# Patient Record
Sex: MALE | Race: BLACK OR AFRICAN AMERICAN | NOT HISPANIC OR LATINO | Employment: FULL TIME | URBAN - METROPOLITAN AREA
[De-identification: names, ages, dates, MRNs, and addresses within clinical notes are randomized per-mention and may not be internally consistent; named-entity substitution may affect disease eponyms.]

---

## 2021-11-17 ENCOUNTER — OFFICE VISIT (OUTPATIENT)
Dept: FAMILY MEDICINE CLINIC | Facility: CLINIC | Age: 51
End: 2021-11-17
Payer: COMMERCIAL

## 2021-11-17 VITALS
BODY MASS INDEX: 27.83 KG/M2 | RESPIRATION RATE: 16 BRPM | TEMPERATURE: 97.4 F | OXYGEN SATURATION: 64 % | HEIGHT: 68 IN | SYSTOLIC BLOOD PRESSURE: 108 MMHG | HEART RATE: 64 BPM | WEIGHT: 183.6 LBS | DIASTOLIC BLOOD PRESSURE: 80 MMHG

## 2021-11-17 DIAGNOSIS — Z13.220 SCREENING, LIPID: ICD-10-CM

## 2021-11-17 DIAGNOSIS — Z00.00 ANNUAL PHYSICAL EXAM: Primary | ICD-10-CM

## 2021-11-17 DIAGNOSIS — Z13.0 SCREENING, ANEMIA, DEFICIENCY, IRON: ICD-10-CM

## 2021-11-17 DIAGNOSIS — G89.29 CHRONIC RIGHT-SIDED LOW BACK PAIN WITH RIGHT-SIDED SCIATICA: ICD-10-CM

## 2021-11-17 DIAGNOSIS — Z13.29 SCREENING FOR THYROID DISORDER: ICD-10-CM

## 2021-11-17 DIAGNOSIS — Z12.5 SCREENING FOR MALIGNANT NEOPLASM OF PROSTATE: ICD-10-CM

## 2021-11-17 DIAGNOSIS — M54.41 CHRONIC RIGHT-SIDED LOW BACK PAIN WITH RIGHT-SIDED SCIATICA: ICD-10-CM

## 2021-11-17 DIAGNOSIS — Z23 NEED FOR TDAP VACCINATION: ICD-10-CM

## 2021-11-17 DIAGNOSIS — K50.90 CROHN'S DISEASE WITHOUT COMPLICATION, UNSPECIFIED GASTROINTESTINAL TRACT LOCATION (HCC): ICD-10-CM

## 2021-11-17 DIAGNOSIS — Z11.3 SCREEN FOR STD (SEXUALLY TRANSMITTED DISEASE): ICD-10-CM

## 2021-11-17 PROCEDURE — 3008F BODY MASS INDEX DOCD: CPT | Performed by: NURSE PRACTITIONER

## 2021-11-17 PROCEDURE — 90471 IMMUNIZATION ADMIN: CPT

## 2021-11-17 PROCEDURE — 3725F SCREEN DEPRESSION PERFORMED: CPT | Performed by: NURSE PRACTITIONER

## 2021-11-17 PROCEDURE — 99386 PREV VISIT NEW AGE 40-64: CPT | Performed by: NURSE PRACTITIONER

## 2021-11-17 PROCEDURE — 1036F TOBACCO NON-USER: CPT | Performed by: NURSE PRACTITIONER

## 2021-11-17 PROCEDURE — 90715 TDAP VACCINE 7 YRS/> IM: CPT

## 2021-11-17 RX ORDER — OMEGA-3S/DHA/EPA/FISH OIL/D3 300MG-1000
400 CAPSULE ORAL DAILY
COMMUNITY

## 2021-11-17 RX ORDER — RIBOFLAVIN (VITAMIN B2) 100 MG
100 TABLET ORAL DAILY
COMMUNITY

## 2021-11-17 RX ORDER — ZINC GLUCONATE 50 MG
50 TABLET ORAL DAILY
COMMUNITY

## 2021-12-12 ENCOUNTER — APPOINTMENT (OUTPATIENT)
Dept: LAB | Facility: CLINIC | Age: 51
End: 2021-12-12
Payer: COMMERCIAL

## 2021-12-12 DIAGNOSIS — Z13.0 SCREENING, ANEMIA, DEFICIENCY, IRON: ICD-10-CM

## 2021-12-12 DIAGNOSIS — Z12.5 SCREENING FOR MALIGNANT NEOPLASM OF PROSTATE: ICD-10-CM

## 2021-12-12 DIAGNOSIS — Z11.3 SCREEN FOR STD (SEXUALLY TRANSMITTED DISEASE): ICD-10-CM

## 2021-12-12 DIAGNOSIS — Z13.220 SCREENING, LIPID: ICD-10-CM

## 2021-12-12 DIAGNOSIS — Z13.29 SCREENING FOR THYROID DISORDER: ICD-10-CM

## 2021-12-12 LAB
ALBUMIN SERPL BCP-MCNC: 4 G/DL (ref 3.5–5)
ALP SERPL-CCNC: 85 U/L (ref 46–116)
ALT SERPL W P-5'-P-CCNC: 30 U/L (ref 12–78)
ANION GAP SERPL CALCULATED.3IONS-SCNC: 7 MMOL/L (ref 4–13)
AST SERPL W P-5'-P-CCNC: 20 U/L (ref 5–45)
BASOPHILS # BLD AUTO: 0.01 THOUSANDS/ΜL (ref 0–0.1)
BASOPHILS NFR BLD AUTO: 0 % (ref 0–1)
BILIRUB SERPL-MCNC: 0.29 MG/DL (ref 0.2–1)
BUN SERPL-MCNC: 19 MG/DL (ref 5–25)
CALCIUM SERPL-MCNC: 8.9 MG/DL (ref 8.3–10.1)
CHLORIDE SERPL-SCNC: 103 MMOL/L (ref 100–108)
CHOLEST SERPL-MCNC: 163 MG/DL
CO2 SERPL-SCNC: 28 MMOL/L (ref 21–32)
CREAT SERPL-MCNC: 1.15 MG/DL (ref 0.6–1.3)
EOSINOPHIL # BLD AUTO: 0.15 THOUSAND/ΜL (ref 0–0.61)
EOSINOPHIL NFR BLD AUTO: 4 % (ref 0–6)
ERYTHROCYTE [DISTWIDTH] IN BLOOD BY AUTOMATED COUNT: 14.1 % (ref 11.6–15.1)
GFR SERPL CREATININE-BSD FRML MDRD: 84 ML/MIN/1.73SQ M
GLUCOSE P FAST SERPL-MCNC: 114 MG/DL (ref 65–99)
HAV IGM SER QL: NORMAL
HBV CORE IGM SER QL: NORMAL
HBV SURFACE AG SER QL: NORMAL
HCT VFR BLD AUTO: 43.6 % (ref 36.5–49.3)
HCV AB SER QL: NORMAL
HDLC SERPL-MCNC: 53 MG/DL
HGB BLD-MCNC: 14.5 G/DL (ref 12–17)
IMM GRANULOCYTES # BLD AUTO: 0.01 THOUSAND/UL (ref 0–0.2)
IMM GRANULOCYTES NFR BLD AUTO: 0 % (ref 0–2)
LDLC SERPL CALC-MCNC: 98 MG/DL (ref 0–100)
LYMPHOCYTES # BLD AUTO: 1.4 THOUSANDS/ΜL (ref 0.6–4.47)
LYMPHOCYTES NFR BLD AUTO: 36 % (ref 14–44)
MCH RBC QN AUTO: 29.7 PG (ref 26.8–34.3)
MCHC RBC AUTO-ENTMCNC: 33.3 G/DL (ref 31.4–37.4)
MCV RBC AUTO: 89 FL (ref 82–98)
MONOCYTES # BLD AUTO: 0.44 THOUSAND/ΜL (ref 0.17–1.22)
MONOCYTES NFR BLD AUTO: 11 % (ref 4–12)
NEUTROPHILS # BLD AUTO: 1.93 THOUSANDS/ΜL (ref 1.85–7.62)
NEUTS SEG NFR BLD AUTO: 49 % (ref 43–75)
NRBC BLD AUTO-RTO: 0 /100 WBCS
PLATELET # BLD AUTO: 243 THOUSANDS/UL (ref 149–390)
PMV BLD AUTO: 11.3 FL (ref 8.9–12.7)
POTASSIUM SERPL-SCNC: 4.8 MMOL/L (ref 3.5–5.3)
PROT SERPL-MCNC: 8 G/DL (ref 6.4–8.2)
PSA SERPL-MCNC: 0.6 NG/ML (ref 0–4)
RBC # BLD AUTO: 4.88 MILLION/UL (ref 3.88–5.62)
SODIUM SERPL-SCNC: 138 MMOL/L (ref 136–145)
TRIGL SERPL-MCNC: 61 MG/DL
TSH SERPL DL<=0.05 MIU/L-ACNC: 1.14 UIU/ML (ref 0.36–3.74)
WBC # BLD AUTO: 3.94 THOUSAND/UL (ref 4.31–10.16)

## 2021-12-12 PROCEDURE — G0103 PSA SCREENING: HCPCS

## 2021-12-12 PROCEDURE — 86592 SYPHILIS TEST NON-TREP QUAL: CPT

## 2021-12-12 PROCEDURE — 85025 COMPLETE CBC W/AUTO DIFF WBC: CPT

## 2021-12-12 PROCEDURE — 84443 ASSAY THYROID STIM HORMONE: CPT

## 2021-12-12 PROCEDURE — 87491 CHLMYD TRACH DNA AMP PROBE: CPT

## 2021-12-12 PROCEDURE — 80061 LIPID PANEL: CPT

## 2021-12-12 PROCEDURE — 87389 HIV-1 AG W/HIV-1&-2 AB AG IA: CPT

## 2021-12-12 PROCEDURE — 80053 COMPREHEN METABOLIC PANEL: CPT

## 2021-12-12 PROCEDURE — 36415 COLL VENOUS BLD VENIPUNCTURE: CPT

## 2021-12-12 PROCEDURE — 87591 N.GONORRHOEAE DNA AMP PROB: CPT

## 2021-12-12 PROCEDURE — 80074 ACUTE HEPATITIS PANEL: CPT

## 2021-12-13 LAB — RPR SER QL: NORMAL

## 2021-12-14 LAB
C TRACH DNA SPEC QL NAA+PROBE: NEGATIVE
HIV 1+2 AB+HIV1 P24 AG SERPL QL IA: NORMAL
N GONORRHOEA DNA SPEC QL NAA+PROBE: NEGATIVE

## 2021-12-17 ENCOUNTER — HOSPITAL ENCOUNTER (OUTPATIENT)
Dept: RADIOLOGY | Facility: HOSPITAL | Age: 51
Discharge: HOME | End: 2021-12-17
Payer: COMMERCIAL

## 2021-12-17 DIAGNOSIS — G89.29 CHRONIC RIGHT-SIDED LOW BACK PAIN WITH RIGHT-SIDED SCIATICA: ICD-10-CM

## 2021-12-17 DIAGNOSIS — M54.41 CHRONIC RIGHT-SIDED LOW BACK PAIN WITH RIGHT-SIDED SCIATICA: ICD-10-CM

## 2021-12-17 PROCEDURE — 72110 X-RAY EXAM L-2 SPINE 4/>VWS: CPT

## 2021-12-17 PROCEDURE — 72202 X-RAY EXAM SI JOINTS 3/> VWS: CPT

## 2021-12-20 ENCOUNTER — EVALUATION (OUTPATIENT)
Dept: PHYSICAL THERAPY | Facility: REHABILITATION | Age: 51
End: 2021-12-20
Payer: COMMERCIAL

## 2021-12-20 DIAGNOSIS — R42 VERTIGO: ICD-10-CM

## 2021-12-20 DIAGNOSIS — M54.41 CHRONIC RIGHT-SIDED LOW BACK PAIN WITH RIGHT-SIDED SCIATICA: Primary | ICD-10-CM

## 2021-12-20 DIAGNOSIS — G89.29 CHRONIC RIGHT-SIDED LOW BACK PAIN WITH RIGHT-SIDED SCIATICA: Primary | ICD-10-CM

## 2021-12-20 PROCEDURE — 97162 PT EVAL MOD COMPLEX 30 MIN: CPT | Performed by: PHYSICAL THERAPIST

## 2021-12-20 PROCEDURE — 97112 NEUROMUSCULAR REEDUCATION: CPT | Performed by: PHYSICAL THERAPIST

## 2022-01-08 ENCOUNTER — IMMUNIZATIONS (OUTPATIENT)
Dept: FAMILY MEDICINE CLINIC | Facility: HOSPITAL | Age: 52
End: 2022-01-08

## 2022-01-08 PROCEDURE — 0031A COVID-19 J&J (JANSSEN) VACCINE 0.5 ML IM: CPT

## 2022-01-08 PROCEDURE — 91303 COVID-19 J&J (JANSSEN) VACCINE 0.5 ML IM: CPT

## 2022-01-21 ENCOUNTER — CONSULT (OUTPATIENT)
Dept: GASTROENTEROLOGY | Facility: AMBULARY SURGERY CENTER | Age: 52
End: 2022-01-21
Payer: COMMERCIAL

## 2022-01-21 VITALS
BODY MASS INDEX: 28.04 KG/M2 | HEIGHT: 68 IN | WEIGHT: 185 LBS | SYSTOLIC BLOOD PRESSURE: 112 MMHG | DIASTOLIC BLOOD PRESSURE: 68 MMHG

## 2022-01-21 DIAGNOSIS — K50.90 CROHN'S DISEASE WITHOUT COMPLICATION, UNSPECIFIED GASTROINTESTINAL TRACT LOCATION (HCC): ICD-10-CM

## 2022-01-21 DIAGNOSIS — K21.9 GASTROESOPHAGEAL REFLUX DISEASE, UNSPECIFIED WHETHER ESOPHAGITIS PRESENT: Primary | ICD-10-CM

## 2022-01-21 PROCEDURE — 1036F TOBACCO NON-USER: CPT | Performed by: INTERNAL MEDICINE

## 2022-01-21 PROCEDURE — 99244 OFF/OP CNSLTJ NEW/EST MOD 40: CPT | Performed by: INTERNAL MEDICINE

## 2022-01-21 PROCEDURE — 3008F BODY MASS INDEX DOCD: CPT | Performed by: INTERNAL MEDICINE

## 2022-01-21 RX ORDER — OMEPRAZOLE 40 MG/1
40 CAPSULE, DELAYED RELEASE ORAL DAILY
Qty: 30 CAPSULE | Refills: 3 | Status: SHIPPED | OUTPATIENT
Start: 2022-01-21

## 2022-01-21 RX ORDER — SODIUM PICOSULFATE, MAGNESIUM OXIDE, AND ANHYDROUS CITRIC ACID 10; 3.5; 12 MG/160ML; G/160ML; G/160ML
LIQUID ORAL
Qty: 320 ML | Refills: 0 | Status: SHIPPED | OUTPATIENT
Start: 2022-01-21 | End: 2022-04-04 | Stop reason: ALTCHOICE

## 2022-01-21 NOTE — LETTER
January 21, 2022     Tory Damon [address] [facility]  [address]  [address] [id number]    Patient: Beckie Dunne   YOB: 1970   Date of Visit: 1/21/2022       Dear Dr Melissa Marx:    Thank you for referring Beckie Dunne to me for evaluation  Below are my notes for this consultation  If you have questions, please do not hesitate to call me  I look forward to following your patient along with you  Sincerely,        Beryle Homme, MD        CC: No Recipients  Beryle Homme, MD  1/21/2022  2:20 PM  Sign when Signing Visit  Consultation - [address] [facility]  Beckie Dunne 46 y o  male MRN: [id number]  Unit/Bed#:  Encounter: [id number]        Consults    ASSESSMENT/PLAN:       1  Crohn's disease-diagnosed 30 years ago requiring bowel resection, unclear whether this was small or large bowel  No recent imaging  No recent colonoscopy  Patient is not on any medications  Has not been on any medications for over 20 years  He does have occasional symptoms of flare which mainly consist of obstructive symptoms, from patient report, it appears that she he may have had stricture ring disease in the past   -check inflammatory markers including calprotectin, CRP and sed rate   -check iron saturation, B12 and vitamin-D levels  -patient does not smoke cigarettes   -avoid NSAIDs   -patient is fully vaccinated against COVID and has a booster   -recommend influenza vaccine  -recommend colonoscopy to assess disease extent and severity   -if colonoscopy is unremarkable, would recommend CT enterography  2  Longstanding GERD with occasional dysphagia-given frequent symptoms, would recommend omeprazole 40 mg to be taken 30 minutes before breakfast   -avoid NSAIDs   -given chronicity of symptoms, would recommend EGD to assess for John's esophagus and to rule out EOE  Also assess for upper GI Crohn's  - Patient was explained about the lifestyle and dietary modifications    Advised to avoid fatty foods, chocolates, caffeine, alcohol and any other triggering foods  Avoid eating for at least 3 hours before going to bed         ______________________________________________________________________    Reason for Consult / Principal Problem: [unfilled]    HPI: Tea Vang is a 46y o  year old male with history of Crohn's disease, presents for initial evaluation  Patient reports that he was diagnosed with Crohn's disease 30 years ago and underwent bowel resection  He does not recall whether he has small-bowel or large-bowel resection but recalls being told that he had 10 cm removed secondary to stricture ring disease  He reports that he was on Azulfidine and prednisone for few years after his surgery however has not been on any medications for Crohn's disease in over 25-30 years  He denies any diarrhea, rectal bleeding but does report occasional "flare-ups"-which consist of nausea, vomiting and abdominal pain mostly in the right lower quadrant  He reports that this happens almost once a year now  He reports that this used to be more frequent, occurring 3 to 4 times a year  He reports that he has not seen a gastroenterologist in a very long time  He reports that he has a formed nonbloody bowel movement on a daily basis  Denies any mucus, urgency or tenesmus  He denies any abdominal pain on regular basis  He denies anal leakage  He does not take any pain medications including narcotics  Denies any joint aches  No rashes noted  He has not required any steroids in over 20 years  He also reports daily symptoms of acid reflux, reports occasional dysphagia, no hematemesis, coffee-ground emesis or melena  Labs are reviewed, normal albumin, normal CBC, normal CMP with exception of mild elevation of glucose at 114  TSH was normal   Hepatitis-C antibody was negative, HIV was negative  Review of Systems:   The remainder of the review of systems was negative except for the pertinent positives noted in HPI  Historical Information   Past Medical History:   Diagnosis Date    Colitis     Colon polyp     Crohn disease ([address] )      Past Surgical History:   Procedure Laterality Date    BACK SURGERY Bilateral     COLONOSCOPY      GASTROINTESTINAL SURGERY      SKULL FRACTURE ELEVATION       Social History   Social History     Substance and Sexual Activity   Alcohol Use Yes    Comment: socially     Social History     Substance and Sexual Activity   Drug Use Yes    Types: Marijuana     Social History     Tobacco Use   Smoking Status Never Smoker   Smokeless Tobacco Never Used     Family History   Problem Relation Age of Onset    Coronary artery disease Mother     Diabetes Mother     Coronary artery disease Father        Meds/Allergies     (Not in a hospital admission)    No current facility-administered medications for this visit  No Known Allergies    Objective     Blood pressure 112/68, height 5' 8" (1 727 m), weight 83 9 kg (185 lb)  [unfilled]    PHYSICAL EXAM     GEN: well nourished, well developed, no acute distress  HEENT: anicteric, MMM, no cervical or supraclavicular lymphadenopathy  CV: RRR, no m/r/g  CHEST: CTA b/l, no WRR  ABD: +BS, soft, NT/ND, no hepatosplenomegaly  EXT: no c/c/e  SKIN: no rashes,  NEURO: aaox3    Lab Results:   No visits with results within 1 Day(s) from this visit     Latest known visit with results is:   Appointment on 12/12/2021   Component Date Value    Sodium 12/12/2021 138     Potassium 12/12/2021 4 8     Chloride 12/12/2021 103     CO2 12/12/2021 28     ANION GAP 12/12/2021 7     BUN 12/12/2021 19     Creatinine 12/12/2021 1 15     Glucose, Fasting 12/12/2021 114*    Calcium 12/12/2021 8 9     AST 12/12/2021 20     ALT 12/12/2021 30     Alkaline Phosphatase 12/12/2021 85     Total Protein 12/12/2021 8 0     Albumin 12/12/2021 4 0     Total Bilirubin 12/12/2021 0 29     eGFR 12/12/2021 84     WBC 12/12/2021 3 94*    RBC 12/12/2021 4 88     Hemoglobin 12/12/2021 14 5     Hematocrit 12/12/2021 43 6     MCV 12/12/2021 89     MCH 12/12/2021 29 7     MCHC 12/12/2021 33 3     RDW 12/12/2021 14 1     MPV 12/12/2021 11 3     Platelets 63/96/4193 243     nRBC 12/12/2021 0     Neutrophils Relative 12/12/2021 49     Immat GRANS % 12/12/2021 0     Lymphocytes Relative 12/12/2021 36     Monocytes Relative 12/12/2021 11     Eosinophils Relative 12/12/2021 4     Basophils Relative 12/12/2021 0     Neutrophils Absolute 12/12/2021 1 93     Immature Grans Absolute 12/12/2021 0 01     Lymphocytes Absolute 12/12/2021 1 40     Monocytes Absolute 12/12/2021 0 44     Eosinophils Absolute 12/12/2021 0 15     Basophils Absolute 12/12/2021 0 01     TSH 3RD GENERATON 12/12/2021 1 144     Cholesterol 12/12/2021 163     Triglycerides 12/12/2021 61     HDL, Direct 12/12/2021 53     LDL Calculated 12/12/2021 98     HIV-1/HIV-2 Ab 12/12/2021 Non-Reactive     Hepatitis B Surface Ag 12/12/2021 Non-reactive     Hep A IgM 12/12/2021 Non-reactive     Hepatitis C Ab 12/12/2021 Non-reactive     Hep B C IgM 12/12/2021 Non-reactive     RPR 12/12/2021 Non-Reactive     PSA 12/12/2021 0 6     N gonorrhoeae, DNA Probe 12/12/2021 Negative     Chlamydia trachomatis, D* 12/12/2021 Negative      Imaging Studies: I have personally reviewed pertinent films in PACS

## 2022-01-21 NOTE — PATIENT INSTRUCTIONS
Scheduled date of EGD(as of today):4/4/2022  Physician performing EGD:DR MARTI  Location of EGD:[facility]  Instructions reviewed with patient by:ARA DODSON  Clearances: PT VACCINATED

## 2022-01-21 NOTE — PROGRESS NOTES
Consultation -  [facility]  Nacho Morales 46 y o  male MRN: [id number]  Unit/Bed#:  Encounter: [id number]        Consults    ASSESSMENT/PLAN:       1  Crohn's disease-diagnosed 30 years ago requiring bowel resection, unclear whether this was small or large bowel  No recent imaging  No recent colonoscopy  Patient is not on any medications  Has not been on any medications for over 20 years  He does have occasional symptoms of flare which mainly consist of obstructive symptoms, from patient report, it appears that she he may have had stricture ring disease in the past   -check inflammatory markers including calprotectin, CRP and sed rate   -check iron saturation, B12 and vitamin-D levels  -patient does not smoke cigarettes   -avoid NSAIDs   -patient is fully vaccinated against COVID and has a booster   -recommend influenza vaccine  -recommend colonoscopy to assess disease extent and severity   -if colonoscopy is unremarkable, would recommend CT enterography  2  Longstanding GERD with occasional dysphagia-given frequent symptoms, would recommend omeprazole 40 mg to be taken 30 minutes before breakfast   -avoid NSAIDs   -given chronicity of symptoms, would recommend EGD to assess for John's esophagus and to rule out EOE  Also assess for upper GI Crohn's  - Patient was explained about the lifestyle and dietary modifications  Advised to avoid fatty foods, chocolates, caffeine, alcohol and any other triggering foods  Avoid eating for at least 3 hours before going to bed         ______________________________________________________________________    Reason for Consult / Principal Problem: [unfilled]    HPI: Nacho Morales is a 46y o  year old male with history of Crohn's disease, presents for initial evaluation  Patient reports that he was diagnosed with Crohn's disease 30 years ago and underwent bowel resection    He does not recall whether he has small-bowel or large-bowel resection but recalls being told that he had 10 cm removed secondary to stricture ring disease  He reports that he was on Azulfidine and prednisone for few years after his surgery however has not been on any medications for Crohn's disease in over 25-30 years  He denies any diarrhea, rectal bleeding but does report occasional "flare-ups"-which consist of nausea, vomiting and abdominal pain mostly in the right lower quadrant  He reports that this happens almost once a year now  He reports that this used to be more frequent, occurring 3 to 4 times a year  He reports that he has not seen a gastroenterologist in a very long time  He reports that he has a formed nonbloody bowel movement on a daily basis  Denies any mucus, urgency or tenesmus  He denies any abdominal pain on regular basis  He denies anal leakage  He does not take any pain medications including narcotics  Denies any joint aches  No rashes noted  He has not required any steroids in over 20 years  He also reports daily symptoms of acid reflux, reports occasional dysphagia, no hematemesis, coffee-ground emesis or melena  Labs are reviewed, normal albumin, normal CBC, normal CMP with exception of mild elevation of glucose at 114  TSH was normal   Hepatitis-C antibody was negative, HIV was negative  Review of Systems: The remainder of the review of systems was negative except for the pertinent positives noted in HPI       Historical Information   Past Medical History:   Diagnosis Date    Colitis     Colon polyp     [facility]      Past Surgical History:   Procedure Laterality Date    BACK SURGERY Bilateral     COLONOSCOPY      GASTROINTESTINAL SURGERY      SKULL FRACTURE ELEVATION       Social History   Social History     Substance and Sexual Activity   Alcohol Use Yes    Comment: socially     Social History     Substance and Sexual Activity   Drug Use Yes    Types: Marijuana     Social History     Tobacco Use   Smoking Status Never Smoker   Smokeless Tobacco Never Used     Family History   Problem Relation Age of Onset    Coronary artery disease Mother     Diabetes Mother     Coronary artery disease Father        Meds/Allergies     (Not in a hospital admission)    No current facility-administered medications for this visit  No Known Allergies    Objective     Blood pressure 112/68, height 5' 8" (1 727 m), weight 83 9 kg (185 lb)  [unfilled]    PHYSICAL EXAM     GEN: well nourished, well developed, no acute distress  HEENT: anicteric, MMM, no cervical or supraclavicular lymphadenopathy  CV: RRR, no m/r/g  CHEST: CTA b/l, no WRR  ABD: +BS, soft, NT/ND, no hepatosplenomegaly  EXT: no c/c/e  SKIN: no rashes,  NEURO: aaox3    Lab Results:   No visits with results within 1 Day(s) from this visit     Latest known visit with results is:   Appointment on 12/12/2021   Component Date Value    Sodium 12/12/2021 138     Potassium 12/12/2021 4 8     Chloride 12/12/2021 103     CO2 12/12/2021 28     ANION GAP 12/12/2021 7     BUN 12/12/2021 19     Creatinine 12/12/2021 1 15     Glucose, Fasting 12/12/2021 114*    Calcium 12/12/2021 8 9     AST 12/12/2021 20     ALT 12/12/2021 30     Alkaline Phosphatase 12/12/2021 85     Total Protein 12/12/2021 8 0     Albumin 12/12/2021 4 0     Total Bilirubin 12/12/2021 0 29     eGFR 12/12/2021 84     WBC 12/12/2021 3 94*    RBC 12/12/2021 4 88     Hemoglobin 12/12/2021 14 5     Hematocrit 12/12/2021 43 6     MCV 12/12/2021 89     MCH 12/12/2021 29 7     MCHC 12/12/2021 33 3     RDW 12/12/2021 14 1     MPV 12/12/2021 11 3     Platelets 68/98/3327 243     nRBC 12/12/2021 0     Neutrophils Relative 12/12/2021 49     Immat GRANS % 12/12/2021 0     Lymphocytes Relative 12/12/2021 36     Monocytes Relative 12/12/2021 11     Eosinophils Relative 12/12/2021 4     Basophils Relative 12/12/2021 0     Neutrophils Absolute 12/12/2021 1 93     Immature Grans Absolute 12/12/2021 0 01     Lymphocytes Absolute 12/12/2021 1 40     Monocytes Absolute 12/12/2021 0 44     Eosinophils Absolute 12/12/2021 0 15     Basophils Absolute 12/12/2021 0 01     TSH 3RD GENERATON 12/12/2021 1 144     Cholesterol 12/12/2021 163     Triglycerides 12/12/2021 61     HDL, Direct 12/12/2021 53     LDL Calculated 12/12/2021 98     HIV-1/HIV-2 Ab 12/12/2021 Non-Reactive     Hepatitis B Surface Ag 12/12/2021 Non-reactive     Hep A IgM 12/12/2021 Non-reactive     Hepatitis C Ab 12/12/2021 Non-reactive     Hep B C IgM 12/12/2021 Non-reactive     RPR 12/12/2021 Non-Reactive     PSA 12/12/2021 0 6     N gonorrhoeae, DNA Probe 12/12/2021 Negative     Chlamydia trachomatis, D* 12/12/2021 Negative      Imaging Studies: I have personally reviewed pertinent films in PACS

## 2022-02-02 ENCOUNTER — OFFICE VISIT (OUTPATIENT)
Dept: FAMILY MEDICINE CLINIC | Facility: CLINIC | Age: 52
End: 2022-02-02
Payer: COMMERCIAL

## 2022-02-02 VITALS
BODY MASS INDEX: 28.13 KG/M2 | RESPIRATION RATE: 14 BRPM | HEIGHT: 68 IN | WEIGHT: 185.6 LBS | OXYGEN SATURATION: 96 % | DIASTOLIC BLOOD PRESSURE: 82 MMHG | TEMPERATURE: 98.6 F | SYSTOLIC BLOOD PRESSURE: 128 MMHG | HEART RATE: 52 BPM

## 2022-02-02 DIAGNOSIS — R42 DIZZINESS: ICD-10-CM

## 2022-02-02 DIAGNOSIS — R41.840 DIFFICULTY CONCENTRATING: ICD-10-CM

## 2022-02-02 DIAGNOSIS — R42 LIGHTHEADED: ICD-10-CM

## 2022-02-02 DIAGNOSIS — R42 VERTIGO: Primary | ICD-10-CM

## 2022-02-02 PROCEDURE — 3725F SCREEN DEPRESSION PERFORMED: CPT | Performed by: NURSE PRACTITIONER

## 2022-02-02 PROCEDURE — 99214 OFFICE O/P EST MOD 30 MIN: CPT | Performed by: NURSE PRACTITIONER

## 2022-02-02 PROCEDURE — 1036F TOBACCO NON-USER: CPT | Performed by: NURSE PRACTITIONER

## 2022-02-02 PROCEDURE — 3008F BODY MASS INDEX DOCD: CPT | Performed by: NURSE PRACTITIONER

## 2022-02-02 RX ORDER — MECLIZINE HYDROCHLORIDE 25 MG/1
25 TABLET ORAL 3 TIMES DAILY PRN
Qty: 30 TABLET | Refills: 1 | Status: SHIPPED | OUTPATIENT
Start: 2022-02-02 | End: 2022-07-12 | Stop reason: SDUPTHER

## 2022-02-02 NOTE — PROGRESS NOTES
FAMILY PRACTICE OFFICE VISIT       NAME: Gurdeep Long  AGE: 46 y o  SEX: male       : 1970        MRN: [id number]    DATE: 2022  TIME: 6:51 AM    Assessment and Plan   1  Vertigo  -     meclizine (ANTIVERT) 25 mg tablet; Take 1 tablet (25 mg total) by mouth 3 (three) times a day as needed for dizziness  -     Comprehensive metabolic panel; Future  -     MRI brain w wo contrast; Future; Expected date: 2022    2  Lightheaded  -     Comprehensive metabolic panel; Future  -     MRI brain w wo contrast; Future; Expected date: 2022    3  Dizziness  -     Comprehensive metabolic panel; Future  -     MRI brain w wo contrast; Future; Expected date: 2022    4  Difficulty concentrating  -     MRI brain w wo contrast; Future; Expected date: 2022                 Chief Complaint     Chief Complaint   Patient presents with    Same day sick     foggy, dizzy, light headache, and nausea still from before last visit  Started again x 2 days    Headache       History of Present Illness   Gurdeep Long is a 46y o -year-old male who is here for f/u to dizziness, lightheadedness  Feels like he has a weight on the back of his head that won't move  He has no headaches though  Nausea yesterday am  Room only spins when he gets up too quickly  Had stopped for "a while"  Now it is back  Had gone to one session of physical therapy  Unable to afford, copay too expensive  Sitting feels lightheaded  Unsteady when he walks  Currently has symptoms      Review of Systems   Review of Systems   Constitutional: Negative for fatigue and fever  HENT: Negative for congestion, postnasal drip and rhinorrhea  Eyes: Negative for photophobia and visual disturbance  Respiratory: Negative for cough and shortness of breath  Cardiovascular: Negative for chest pain and palpitations  Gastrointestinal: Negative for constipation, diarrhea, nausea and vomiting  Musculoskeletal: Negative for arthralgias and myalgias  Skin: Negative for rash  Neurological: Positive for dizziness and light-headedness  Negative for facial asymmetry, weakness, numbness and headaches  Hematological: Negative for adenopathy  Psychiatric/Behavioral: Negative for dysphoric mood and sleep disturbance  The patient is not nervous/anxious          Active Problem List     Patient Active Problem List   Diagnosis    Crohn's disease without complication ([place] [address] )    Chronic right-sided low back pain with right-sided sciatica    Gastroesophageal reflux disease    Dizziness    Difficulty concentrating         Past Medical History:  Past Medical History:   Diagnosis Date    Colitis     Colon polyp     Crohn disease ([place] [address] )        Past Surgical History:  Past Surgical History:   Procedure Laterality Date    BACK SURGERY Bilateral     COLONOSCOPY      GASTROINTESTINAL SURGERY      SKULL FRACTURE ELEVATION         Family History:  Family History   Problem Relation Age of Onset    Coronary artery disease Mother     Diabetes Mother     Coronary artery disease Father        Social History:  Social History     Socioeconomic History    Marital status: Single     Spouse name: Not on file    Number of children: Not on file    Years of education: Not on file    Highest education level: Not on file   Occupational History    Not on file   Tobacco Use    Smoking status: Never Smoker    Smokeless tobacco: Never Used   Vaping Use    Vaping Use: Never used   Substance and Sexual Activity    Alcohol use: Yes     Comment: socially    Drug use: Yes     Types: Marijuana    Sexual activity: Yes     Partners: Female   Other Topics Concern    Not on file   Social History Narrative    Not on file     Social Determinants of Health     Financial Resource Strain: Not on file   Food Insecurity: Not on file   Transportation Needs: Not on file   Physical Activity: Not on file   Stress: Not on file   Social Connections: Not on file   Intimate Partner Violence: Not on file   Housing Stability: Not on file       Objective     Vitals:    02/02/22 1106   BP: 128/82   Pulse: (!) 52   Resp: 14   Temp: 98 6 °F (37 °C)   SpO2: 96%     Wt Readings from Last 3 Encounters:   02/02/22 84 2 kg (185 lb 9 6 oz)   01/21/22 83 9 kg (185 lb)   11/17/21 83 3 kg (183 lb 9 6 oz)       Physical Exam  Vitals and nursing note reviewed  Constitutional:       Appearance: Normal appearance  HENT:      Head: Normocephalic and atraumatic  Right Ear: Tympanic membrane, ear canal and external ear normal       Left Ear: Tympanic membrane, ear canal and external ear normal       Mouth/Throat:      Pharynx: Oropharynx is clear  Eyes:      Extraocular Movements:      Right eye: Nystagmus present  Conjunctiva/sclera: Conjunctivae normal    Cardiovascular:      Rate and Rhythm: Normal rate and regular rhythm  Heart sounds: Normal heart sounds  Pulmonary:      Effort: Pulmonary effort is normal       Breath sounds: Normal breath sounds  Abdominal:      General: Bowel sounds are normal    Musculoskeletal:         General: Normal range of motion  Cervical back: Normal range of motion and neck supple  Skin:     General: Skin is warm and dry  Capillary Refill: Capillary refill takes less than 2 seconds  Neurological:      Mental Status: He is alert and oriented to person, place, and time  Cranial Nerves: Cranial nerves are intact  Motor: Motor function is intact  Coordination: Coordination is intact  Gait: Gait is intact  Deep Tendon Reflexes: Reflexes are normal and symmetric  Comments: epley performed with some improvement     Psychiatric:         Mood and Affect: Mood normal          Behavior: Behavior normal          Thought Content:  Thought content normal          Judgment: Judgment normal          Pertinent Laboratory/Diagnostic Studies:  Lab Results   Component Value Date    BUN 19 12/12/2021    CREATININE 1 15 12/12/2021    CALCIUM 8 9 12/12/2021    K 4 8 12/12/2021    CO2 28 12/12/2021     12/12/2021     Lab Results   Component Value Date    ALT 30 12/12/2021    AST 20 12/12/2021    ALKPHOS 85 12/12/2021       Lab Results   Component Value Date    WBC 3 94 (L) 12/12/2021    HGB 14 5 12/12/2021    HCT 43 6 12/12/2021    MCV 89 12/12/2021     12/12/2021       No results found for: TSH    No results found for: CHOL  Lab Results   Component Value Date    TRIG 61 12/12/2021     Lab Results   Component Value Date    HDL 53 12/12/2021     Lab Results   Component Value Date    LDLCALC 98 12/12/2021     No results found for: HGBA1C    Results for orders placed or performed in visit on 12/12/21   Chlamydia/GC amplified DNA by PCR    Specimen: Urine, Other   Result Value Ref Range    N gonorrhoeae, DNA Probe Negative Negative    Chlamydia trachomatis, DNA Probe Negative Negative   Comprehensive metabolic panel   Result Value Ref Range    Sodium 138 136 - 145 mmol/L    Potassium 4 8 3 5 - 5 3 mmol/L    Chloride 103 100 - 108 mmol/L    CO2 28 21 - 32 mmol/L    ANION GAP 7 4 - 13 mmol/L    BUN 19 5 - 25 mg/dL    Creatinine 1 15 0 60 - 1 30 mg/dL    Glucose, Fasting 114 (H) 65 - 99 mg/dL    Calcium 8 9 8 3 - 10 1 mg/dL    AST 20 5 - 45 U/L    ALT 30 12 - 78 U/L    Alkaline Phosphatase 85 46 - 116 U/L    Total Protein 8 0 6 4 - 8 2 g/dL    Albumin 4 0 3 5 - 5 0 g/dL    Total Bilirubin 0 29 0 20 - 1 00 mg/dL    eGFR 84 ml/min/1 73sq m   CBC and differential   Result Value Ref Range    WBC 3 94 (L) 4 31 - 10 16 Thousand/uL    RBC 4 88 3 88 - 5 62 Million/uL    Hemoglobin 14 5 12 0 - 17 0 g/dL    Hematocrit 43 6 36 5 - 49 3 %    MCV 89 82 - 98 fL    MCH 29 7 26 8 - 34 3 pg    MCHC 33 3 31 4 - 37 4 g/dL    RDW 14 1 11 6 - 15 1 %    MPV 11 3 8 9 - 12 7 fL    Platelets 315 987 - 049 Thousands/uL    nRBC 0 /100 WBCs    Neutrophils Relative 49 43 - 75 %    Immat GRANS % 0 0 - 2 %    Lymphocytes Relative 36 14 - 44 %    Monocytes Relative 11 4 - 12 % Eosinophils Relative 4 0 - 6 %    Basophils Relative 0 0 - 1 %    Neutrophils Absolute 1 93 1 85 - 7 62 Thousands/µL    Immature Grans Absolute 0 01 0 00 - 0 20 Thousand/uL    Lymphocytes Absolute 1 40 0 60 - 4 47 Thousands/µL    Monocytes Absolute 0 44 0 17 - 1 22 Thousand/µL    Eosinophils Absolute 0 15 0 00 - 0 61 Thousand/µL    Basophils Absolute 0 01 0 00 - 0 10 Thousands/µL   TSH, 3rd generation with Free T4 reflex   Result Value Ref Range    TSH 3RD GENERATON 1 144 0 358 - 3 740 uIU/mL   Lipid Panel with Direct LDL reflex   Result Value Ref Range    Cholesterol 163 See Comment mg/dL    Triglycerides 61 See Comment mg/dL    HDL, Direct 53 >=40 mg/dL    LDL Calculated 98 0 - 100 mg/dL   HIV 1/2 Antigen/Antibody (4th Generation) w Reflex [facility]   Result Value Ref Range    HIV-1/HIV-2 Ab Non-Reactive Non-Reactive   Hepatitis panel, acute   Result Value Ref Range    Hepatitis B Surface Ag Non-reactive Non-reactive, NonReactive - Confirmed    Hep A IgM Non-reactive Non-reactive, Equivocal-Suggest Recollect    Hepatitis C Ab Non-reactive Non-reactive    Hep B C IgM Non-reactive Non-reactive   RPR   Result Value Ref Range    RPR Non-Reactive Non-Reactive   PSA, Total Screen   Result Value Ref Range    PSA 0 6 0 0 - 4 0 ng/mL       Orders Placed This Encounter   Procedures    MRI brain w wo contrast    Comprehensive metabolic panel       ALLERGIES:  No Known Allergies    Current Medications     Current Outpatient Medications   Medication Sig Dispense Refill    Ascorbic Acid (vitamin C) 100 MG tablet Take 100 mg by mouth daily      cholecalciferol (VITAMIN D3) 400 units tablet Take 400 Units by mouth daily      omeprazole (PriLOSEC) 40 MG capsule Take 1 capsule (40 mg total) by mouth daily 30 capsule 3    Sod Picosulfate-Mag Ox-Cit Acd (Clenpiq) 10-3 5-12 MG-GM -GM/160ML SOLN Follow instructions as given during the office   320 mL 0    zinc gluconate 50 mg tablet Take 50 mg by mouth daily      meclizine (ANTIVERT) 25 mg tablet Take 1 tablet (25 mg total) by mouth 3 (three) times a day as needed for dizziness 30 tablet 1     No current facility-administered medications for this visit  Health Maintenance     Health Maintenance   Topic Date Due    Colorectal Cancer Screening  Never done    Influenza Vaccine (1) 06/30/2022 (Originally 9/1/2021)    BMI: Followup Plan  11/17/2022    Annual Physical  11/17/2022    Depression Screening  02/02/2023    BMI: Adult  02/02/2023    DTaP,Tdap,and Td Vaccines (2 - Td or Tdap) 11/17/2031    HIV Screening  Completed    Hepatitis C Screening  Completed    COVID-19 Vaccine  Completed    Pneumococcal Vaccine: Pediatrics (0 to 5 Years) and At-Risk Patients (6 to 59 Years)  Aged Out    HIB Vaccine  Aged Out    Hepatitis B Vaccine  Aged Out    IPV Vaccine  Aged Out    Hepatitis A Vaccine  Aged Out    Meningococcal ACWY Vaccine  Aged Out    HPV Vaccine  Aged [employer] History   Administered Date(s) Administered    COVID-19 J&J ([employer]) vaccine 0 5 mL 04/12/2021, 01/08/2022    Tdap 11/17/2021     BMI Counseling: Body mass index is 28 22 kg/m²  The BMI is above normal  Nutrition recommendations include decreasing portion sizes, encouraging healthy choices of fruits and vegetables, decreasing fast food intake, consuming healthier snacks, limiting drinks that contain sugar, moderation in carbohydrate intake, increasing intake of lean protein, reducing intake of saturated and trans fat and reducing intake of cholesterol  Exercise recommendations include moderate physical activity 150 minutes/week and exercising 3-5 times per week  No pharmacotherapy was ordered  Rationale for BMI follow-up plan is due to patient being overweight or obese           BRI Ontiveros

## 2022-02-16 PROBLEM — R41.840 DIFFICULTY CONCENTRATING: Status: ACTIVE | Noted: 2022-02-16

## 2022-02-16 PROBLEM — R42 VERTIGO: Status: ACTIVE | Noted: 2022-02-16

## 2022-04-03 ENCOUNTER — APPOINTMENT (OUTPATIENT)
Dept: LAB | Facility: CLINIC | Age: 52
End: 2022-04-03
Payer: COMMERCIAL

## 2022-04-03 DIAGNOSIS — R42 LIGHTHEADED: ICD-10-CM

## 2022-04-03 DIAGNOSIS — K50.90 CROHN'S DISEASE WITHOUT COMPLICATION, UNSPECIFIED GASTROINTESTINAL TRACT LOCATION (HCC): ICD-10-CM

## 2022-04-03 DIAGNOSIS — R42 VERTIGO: ICD-10-CM

## 2022-04-03 DIAGNOSIS — R42 DIZZINESS: ICD-10-CM

## 2022-04-03 LAB
25(OH)D3 SERPL-MCNC: 24.1 NG/ML (ref 30–100)
ALBUMIN SERPL BCP-MCNC: 3.5 G/DL (ref 3.5–5)
ALP SERPL-CCNC: 91 U/L (ref 46–116)
ALT SERPL W P-5'-P-CCNC: 44 U/L (ref 12–78)
ANION GAP SERPL CALCULATED.3IONS-SCNC: 9 MMOL/L (ref 4–13)
AST SERPL W P-5'-P-CCNC: 21 U/L (ref 5–45)
BILIRUB SERPL-MCNC: 0.41 MG/DL (ref 0.2–1)
BUN SERPL-MCNC: 14 MG/DL (ref 5–25)
CALCIUM SERPL-MCNC: 8.8 MG/DL (ref 8.3–10.1)
CHLORIDE SERPL-SCNC: 102 MMOL/L (ref 100–108)
CO2 SERPL-SCNC: 28 MMOL/L (ref 21–32)
CREAT SERPL-MCNC: 1.36 MG/DL (ref 0.6–1.3)
CRP SERPL QL: 4.6 MG/L
ERYTHROCYTE [SEDIMENTATION RATE] IN BLOOD: 10 MM/HOUR (ref 0–19)
GFR SERPL CREATININE-BSD FRML MDRD: 58 ML/MIN/1.73SQ M
GLUCOSE P FAST SERPL-MCNC: 250 MG/DL (ref 65–99)
IRON SATN MFR SERPL: 35 % (ref 20–50)
IRON SERPL-MCNC: 87 UG/DL (ref 65–175)
POTASSIUM SERPL-SCNC: 4.1 MMOL/L (ref 3.5–5.3)
PROT SERPL-MCNC: 7.1 G/DL (ref 6.4–8.2)
SODIUM SERPL-SCNC: 139 MMOL/L (ref 136–145)
TIBC SERPL-MCNC: 247 UG/DL (ref 250–450)
VIT B12 SERPL-MCNC: 508 PG/ML (ref 100–900)

## 2022-04-03 PROCEDURE — 83540 ASSAY OF IRON: CPT

## 2022-04-03 PROCEDURE — 85652 RBC SED RATE AUTOMATED: CPT

## 2022-04-03 PROCEDURE — 80053 COMPREHEN METABOLIC PANEL: CPT

## 2022-04-03 PROCEDURE — 82607 VITAMIN B-12: CPT

## 2022-04-03 PROCEDURE — 83550 IRON BINDING TEST: CPT

## 2022-04-03 PROCEDURE — 86140 C-REACTIVE PROTEIN: CPT

## 2022-04-03 PROCEDURE — 36415 COLL VENOUS BLD VENIPUNCTURE: CPT

## 2022-04-03 PROCEDURE — 82306 VITAMIN D 25 HYDROXY: CPT

## 2022-04-04 ENCOUNTER — HOSPITAL ENCOUNTER (OUTPATIENT)
Dept: GASTROENTEROLOGY | Facility: AMBULARY SURGERY CENTER | Age: 52
Setting detail: OUTPATIENT SURGERY
Discharge: HOME | End: 2022-04-04
Attending: INTERNAL MEDICINE | Admitting: INTERNAL MEDICINE
Payer: COMMERCIAL

## 2022-04-04 ENCOUNTER — ANESTHESIA EVENT (OUTPATIENT)
Dept: GASTROENTEROLOGY | Facility: AMBULARY SURGERY CENTER | Age: 52
End: 2022-04-04

## 2022-04-04 ENCOUNTER — ANESTHESIA (OUTPATIENT)
Dept: GASTROENTEROLOGY | Facility: AMBULARY SURGERY CENTER | Age: 52
End: 2022-04-04

## 2022-04-04 VITALS
DIASTOLIC BLOOD PRESSURE: 72 MMHG | OXYGEN SATURATION: 100 % | HEART RATE: 57 BPM | RESPIRATION RATE: 16 BRPM | TEMPERATURE: 97.5 F | SYSTOLIC BLOOD PRESSURE: 138 MMHG

## 2022-04-04 DIAGNOSIS — K21.9 GASTROESOPHAGEAL REFLUX DISEASE, UNSPECIFIED WHETHER ESOPHAGITIS PRESENT: ICD-10-CM

## 2022-04-04 DIAGNOSIS — K50.90 CROHN'S DISEASE WITHOUT COMPLICATION, UNSPECIFIED GASTROINTESTINAL TRACT LOCATION (HCC): ICD-10-CM

## 2022-04-04 PROCEDURE — 45380 COLONOSCOPY AND BIOPSY: CPT | Performed by: INTERNAL MEDICINE

## 2022-04-04 PROCEDURE — 88305 TISSUE EXAM BY PATHOLOGIST: CPT | Performed by: SPECIALIST

## 2022-04-04 PROCEDURE — 43239 EGD BIOPSY SINGLE/MULTIPLE: CPT | Performed by: INTERNAL MEDICINE

## 2022-04-04 RX ORDER — SODIUM CHLORIDE, SODIUM LACTATE, POTASSIUM CHLORIDE, CALCIUM CHLORIDE 600; 310; 30; 20 MG/100ML; MG/100ML; MG/100ML; MG/100ML
INJECTION, SOLUTION INTRAVENOUS CONTINUOUS PRN
Status: DISCONTINUED | OUTPATIENT
Start: 2022-04-04 | End: 2022-04-04

## 2022-04-04 RX ORDER — LIDOCAINE HYDROCHLORIDE 10 MG/ML
INJECTION, SOLUTION EPIDURAL; INFILTRATION; INTRACAUDAL; PERINEURAL AS NEEDED
Status: DISCONTINUED | OUTPATIENT
Start: 2022-04-04 | End: 2022-04-04

## 2022-04-04 RX ORDER — ONDANSETRON 2 MG/ML
4 INJECTION INTRAMUSCULAR; INTRAVENOUS ONCE AS NEEDED
Status: CANCELLED | OUTPATIENT
Start: 2022-04-04

## 2022-04-04 RX ORDER — SODIUM CHLORIDE, SODIUM LACTATE, POTASSIUM CHLORIDE, CALCIUM CHLORIDE 600; 310; 30; 20 MG/100ML; MG/100ML; MG/100ML; MG/100ML
125 INJECTION, SOLUTION INTRAVENOUS CONTINUOUS
Status: DISCONTINUED | OUTPATIENT
Start: 2022-04-04 | End: 2022-04-08 | Stop reason: HOSPADM

## 2022-04-04 RX ORDER — PROPOFOL 10 MG/ML
INJECTION, EMULSION INTRAVENOUS AS NEEDED
Status: DISCONTINUED | OUTPATIENT
Start: 2022-04-04 | End: 2022-04-04

## 2022-04-04 RX ADMIN — PROPOFOL 50 MG: 10 INJECTION, EMULSION INTRAVENOUS at 10:37

## 2022-04-04 RX ADMIN — PROPOFOL 200 MG: 10 INJECTION, EMULSION INTRAVENOUS at 10:19

## 2022-04-04 RX ADMIN — PROPOFOL 50 MG: 10 INJECTION, EMULSION INTRAVENOUS at 10:24

## 2022-04-04 RX ADMIN — SODIUM CHLORIDE, SODIUM LACTATE, POTASSIUM CHLORIDE, AND CALCIUM CHLORIDE: .6; .31; .03; .02 INJECTION, SOLUTION INTRAVENOUS at 10:05

## 2022-04-04 RX ADMIN — PROPOFOL 50 MG: 10 INJECTION, EMULSION INTRAVENOUS at 10:20

## 2022-04-04 RX ADMIN — PROPOFOL 50 MG: 10 INJECTION, EMULSION INTRAVENOUS at 10:28

## 2022-04-04 RX ADMIN — PROPOFOL 50 MG: 10 INJECTION, EMULSION INTRAVENOUS at 10:44

## 2022-04-04 RX ADMIN — LIDOCAINE HYDROCHLORIDE 50 MG: 10 INJECTION, SOLUTION EPIDURAL; INFILTRATION; INTRACAUDAL; PERINEURAL at 10:19

## 2022-04-04 RX ADMIN — PROPOFOL 50 MG: 10 INJECTION, EMULSION INTRAVENOUS at 10:32

## 2022-04-04 NOTE — ANESTHESIA POSTPROCEDURE EVALUATION
Post-Op Assessment Note    CV Status:  Stable  Pain Score: 0    Pain management: adequate     Mental Status:  Alert and awake   Hydration Status:  Euvolemic   PONV Controlled:  Controlled   Airway Patency:  Patent      Post Op Vitals Reviewed: Yes      Staff: CRNA, Anesthesiologist   Comments: VSS        No complications documented      BP   129/69   Temp      Pulse 57   Resp   16   SpO2   100

## 2022-04-04 NOTE — ANESTHESIA PREPROCEDURE EVALUATION
Procedure:  COLONOSCOPY  EGD    Relevant Problems   ANESTHESIA (within normal limits)      CARDIO (within normal limits)      GI/HEPATIC   (+) Gastroesophageal reflux disease      MUSCULOSKELETAL   (+) Chronic right-sided low back pain with right-sided sciatica      NEURO/PSYCH   (+) Chronic right-sided low back pain with right-sided sciatica      PULMONARY (within normal limits)        Physical Exam    Airway    Mallampati score: II  TM Distance: >3 FB  Neck ROM: full     Dental   Comment: No loose,     Cardiovascular  Rhythm: regular, Rate: normal,     Pulmonary  Breath sounds clear to auscultation,     Other Findings        Anesthesia Plan  ASA Score- 2     Anesthesia Type- IV sedation with anesthesia with ASA Monitors  Additional Monitors:   Airway Plan:           Plan Factors-Exercise tolerance (METS): >4 METS  Chart reviewed  Existing labs reviewed  Patient summary reviewed  Patient is not a current smoker  Induction-     Postoperative Plan-     Informed Consent- Anesthetic plan and risks discussed with patient  I personally reviewed this patient with the CRNA  Discussed and agreed on the Anesthesia Plan with the CRNA  Angelo Castillo

## 2022-04-04 NOTE — H&P
History and Physical -  [facility]  Rick Hernandez 46 y o  male MRN: [id number]    HPI: Rick Hernandez is a 46y o  year old male who presents for evaluation of GERD and Crohn's disease  Review of Systems    Historical Information   Past Medical History:   Diagnosis Date    Colitis     Colon polyp     Crohn disease ([address] )      Past Surgical History:   Procedure Laterality Date    BACK SURGERY      lumbar discectomy     COLON SURGERY  2002    11" inrestines removed   COLONOSCOPY      SKULL FRACTURE ELEVATION       Social History   Social History     Substance and Sexual Activity   Alcohol Use Yes    Comment: socially     Social History     Substance and Sexual Activity   Drug Use Yes    Types: Marijuana     Social History     Tobacco Use   Smoking Status Never Smoker   Smokeless Tobacco Never Used     Family History   Problem Relation Age of Onset    Coronary artery disease Mother     Diabetes Mother     Coronary artery disease Father        Meds/Allergies     (Not in a hospital admission)      No Known Allergies    Objective     /54   Pulse (!) 50   Temp 97 5 °F (36 4 °C) (Temporal)   Resp 18   SpO2 97%       PHYSICAL EXAM    Gen: NAD  CV: RRR  CHEST: Clear  ABD: soft, NT/ND  EXT: no edema  Neuro: AAO      ASSESSMENT/PLAN:  This is a 46y o  year old male here for evaluation of GERD and Crohn's disease  PLAN:   Procedure:  EGD and colonoscopy

## 2022-04-05 ENCOUNTER — TELEPHONE (OUTPATIENT)
Dept: GASTROENTEROLOGY | Facility: AMBULARY SURGERY CENTER | Age: 52
End: 2022-04-05

## 2022-04-05 NOTE — TELEPHONE ENCOUNTER
Pt aware of results and recommendations, verbalized understanding  Advised to call office with any questions or concerns  I provided number to central scheduling for small bowel series

## 2022-04-05 NOTE — TELEPHONE ENCOUNTER
----- Message from Fabian Fowler MD sent at 4/5/2022  7:14 AM EDT -----  Please inform the patient that the vitamin-D levels are mildly decreased, would recommend 1000 units of vitamin-D on daily basis  Would also recommend CT enterography which was recommended yesterday at the time of colonoscopy to identify the small bowel involvement Crohn's

## 2022-04-11 ENCOUNTER — APPOINTMENT (OUTPATIENT)
Dept: LAB | Facility: CLINIC | Age: 52
End: 2022-04-11
Payer: COMMERCIAL

## 2022-04-11 ENCOUNTER — OFFICE VISIT (OUTPATIENT)
Dept: FAMILY MEDICINE CLINIC | Facility: CLINIC | Age: 52
End: 2022-04-11
Payer: COMMERCIAL

## 2022-04-11 VITALS
WEIGHT: 181 LBS | BODY MASS INDEX: 27.52 KG/M2 | RESPIRATION RATE: 16 BRPM | OXYGEN SATURATION: 99 % | HEART RATE: 74 BPM | DIASTOLIC BLOOD PRESSURE: 78 MMHG | TEMPERATURE: 98.4 F | SYSTOLIC BLOOD PRESSURE: 128 MMHG

## 2022-04-11 DIAGNOSIS — R73.09 ELEVATED GLUCOSE: ICD-10-CM

## 2022-04-11 DIAGNOSIS — R79.89 ELEVATED SERUM CREATININE: Primary | ICD-10-CM

## 2022-04-11 DIAGNOSIS — R79.89 ELEVATED SERUM CREATININE: ICD-10-CM

## 2022-04-11 LAB
ALBUMIN SERPL BCP-MCNC: 3.9 G/DL (ref 3.5–5)
ALP SERPL-CCNC: 89 U/L (ref 46–116)
ALT SERPL W P-5'-P-CCNC: 28 U/L (ref 12–78)
ANION GAP SERPL CALCULATED.3IONS-SCNC: 9 MMOL/L (ref 4–13)
AST SERPL W P-5'-P-CCNC: 16 U/L (ref 5–45)
BILIRUB SERPL-MCNC: 0.31 MG/DL (ref 0.2–1)
BUN SERPL-MCNC: 14 MG/DL (ref 5–25)
CALCIUM SERPL-MCNC: 9 MG/DL (ref 8.3–10.1)
CHLORIDE SERPL-SCNC: 105 MMOL/L (ref 100–108)
CO2 SERPL-SCNC: 27 MMOL/L (ref 21–32)
CREAT SERPL-MCNC: 1.29 MG/DL (ref 0.6–1.3)
EST. AVERAGE GLUCOSE BLD GHB EST-MCNC: 140 MG/DL
GFR SERPL CREATININE-BSD FRML MDRD: 62 ML/MIN/1.73SQ M
GLUCOSE SERPL-MCNC: 102 MG/DL (ref 65–140)
HBA1C MFR BLD: 6.5 %
POTASSIUM SERPL-SCNC: 4.1 MMOL/L (ref 3.5–5.3)
PROT SERPL-MCNC: 7.9 G/DL (ref 6.4–8.2)
SL AMB POCT GLUCOSE BLD: 118
SL AMB POCT HEMOGLOBIN AIC: 6.7 (ref ?–6.5)
SODIUM SERPL-SCNC: 141 MMOL/L (ref 136–145)

## 2022-04-11 PROCEDURE — 99213 OFFICE O/P EST LOW 20 MIN: CPT | Performed by: NURSE PRACTITIONER

## 2022-04-11 PROCEDURE — 82948 REAGENT STRIP/BLOOD GLUCOSE: CPT | Performed by: NURSE PRACTITIONER

## 2022-04-11 PROCEDURE — 83036 HEMOGLOBIN GLYCOSYLATED A1C: CPT | Performed by: NURSE PRACTITIONER

## 2022-04-11 PROCEDURE — 36415 COLL VENOUS BLD VENIPUNCTURE: CPT

## 2022-04-11 PROCEDURE — 83036 HEMOGLOBIN GLYCOSYLATED A1C: CPT

## 2022-04-11 PROCEDURE — 80053 COMPREHEN METABOLIC PANEL: CPT

## 2022-04-11 NOTE — PROGRESS NOTES
FAMILY PRACTICE OFFICE VISIT       NAME: Mary Shearer  AGE: 46 y o  SEX: male       : 1970        MRN: [id number]    DATE: 2022  TIME: 4:59 PM    Assessment and Plan   1  Elevated serum creatinine  -     Basic metabolic panel; Future  -     HEMOGLOBIN A1C W/ EAG ESTIMATION; Future  -     Comprehensive metabolic panel; Future    2  Elevated glucose  -     POCT hemoglobin A1c  -     POCT blood glucose  -     HEMOGLOBIN A1C W/ EAG ESTIMATION; Future  -     Comprehensive metabolic panel; Future  -     Ambulatory Referral to Diabetic Education; Future       Wants to do lifestyle changes before starting any medications for elevated blood sugars            Chief Complaint     Chief Complaint   Patient presents with    same day sick     lab review       History of Present Illness   Mary Shearer is a 46y o -year-old male who is here for f/u to abnormal labs from colonoscopy  Creatinine was 1 36 and glucose was 250  He feels well  Family history of diabetes  Mother and possible diabetes  Both had heart disease as well  Most recent labs reviewed  No s/s of diabetes      Review of Systems   Review of Systems   Constitutional: Negative for fatigue and fever  HENT: Negative for congestion and postnasal drip  Eyes: Negative for photophobia and visual disturbance  Respiratory: Negative for cough, shortness of breath and wheezing  Cardiovascular: Negative for chest pain and palpitations  Gastrointestinal: Negative for constipation, diarrhea, nausea and vomiting  Endocrine: Negative for polydipsia, polyphagia and polyuria  Genitourinary: Negative for frequency and hematuria  Musculoskeletal: Negative for arthralgias and myalgias  Skin: Negative for rash  Neurological: Negative for dizziness, light-headedness and headaches  Hematological: Negative for adenopathy  Psychiatric/Behavioral: Negative for dysphoric mood and sleep disturbance  The patient is not nervous/anxious          Active Problem List     Patient Active Problem List   Diagnosis    Crohn's disease without complication ([facility] )    Chronic right-sided low back pain with right-sided sciatica    Gastroesophageal reflux disease    Difficulty concentrating    Elevated serum creatinine    Elevated glucose         Past Medical History:  Past Medical History:   Diagnosis Date    Colitis     Colon polyp     Crohn disease ([facility] )        Past Surgical History:  Past Surgical History:   Procedure Laterality Date    BACK SURGERY      lumbar discectomy     COLON SURGERY  2002    11" inrestines removed      COLONOSCOPY      SKULL FRACTURE ELEVATION         Family History:  Family History   Problem Relation Age of Onset    Coronary artery disease Mother     Diabetes Mother     Coronary artery disease Father        Social History:  Social History     Socioeconomic History    Marital status: Single     Spouse name: Not on file    Number of children: Not on file    Years of education: Not on file    Highest education level: Not on file   Occupational History    Not on file   Tobacco Use    Smoking status: Never Smoker    Smokeless tobacco: Never Used   Vaping Use    Vaping Use: Never used   Substance and Sexual Activity    Alcohol use: Yes     Comment: socially    Drug use: Yes     Types: Marijuana    Sexual activity: Yes     Partners: Female   Other Topics Concern    Not on file   Social History Narrative    Not on file     Social Determinants of Health     Financial Resource Strain: Not on file   Food Insecurity: Not on file   Transportation Needs: Not on file   Physical Activity: Not on file   Stress: Not on file   Social Connections: Not on file   Intimate Partner Violence: Not on file   Housing Stability: Not on file       Objective     Vitals:    04/11/22 1413   BP: 128/78   Pulse: 74   Resp: 16   Temp: 98 4 °F (36 9 °C)   SpO2: 99%     Wt Readings from Last 3 Encounters:   04/11/22 82 1 kg (181 lb)   02/02/22 84 2 kg (185 lb 9 6 oz)   01/21/22 83 9 kg (185 lb)       Physical Exam  Vitals and nursing note reviewed  Constitutional:       Appearance: Normal appearance  HENT:      Head: Normocephalic and atraumatic  Mouth/Throat:      Mouth: Mucous membranes are moist    Eyes:      Conjunctiva/sclera: Conjunctivae normal    Cardiovascular:      Rate and Rhythm: Normal rate and regular rhythm  Heart sounds: Normal heart sounds  Pulmonary:      Effort: Pulmonary effort is normal       Breath sounds: Normal breath sounds  Abdominal:      General: Bowel sounds are normal    Musculoskeletal:         General: Normal range of motion  Cervical back: Normal range of motion and neck supple  Skin:     General: Skin is warm  Neurological:      Mental Status: He is alert and oriented to person, place, and time     Psychiatric:         Mood and Affect: Mood normal          Behavior: Behavior normal          Pertinent Laboratory/Diagnostic Studies:  Lab Results   Component Value Date    BUN 14 04/11/2022    CREATININE 1 29 04/11/2022    CALCIUM 9 0 04/11/2022    K 4 1 04/11/2022    CO2 27 04/11/2022     04/11/2022     Lab Results   Component Value Date    ALT 28 04/11/2022    AST 16 04/11/2022    ALKPHOS 89 04/11/2022       Lab Results   Component Value Date    WBC 3 94 (L) 12/12/2021    HGB 14 5 12/12/2021    HCT 43 6 12/12/2021    MCV 89 12/12/2021     12/12/2021       No results found for: TSH    No results found for: CHOL  Lab Results   Component Value Date    TRIG 61 12/12/2021     Lab Results   Component Value Date    HDL 53 12/12/2021     Lab Results   Component Value Date    LDLCALC 98 12/12/2021     Lab Results   Component Value Date    HGBA1C 6 5 (H) 04/11/2022       Results for orders placed or performed in visit on 04/11/22   POCT hemoglobin A1c   Result Value Ref Range    Hemoglobin A1C 6 7 (A) 6 5   POCT blood glucose   Result Value Ref Range    GLUCOSE         Orders Placed This Encounter Procedures    Basic metabolic panel    HEMOGLOBIN A1C W/ EAG ESTIMATION    Comprehensive metabolic panel    Ambulatory Referral to Diabetic Education    POCT hemoglobin A1c    POCT blood glucose       ALLERGIES:  No Known Allergies    Current Medications     Current Outpatient Medications   Medication Sig Dispense Refill    Ascorbic Acid (vitamin C) 100 MG tablet Take 100 mg by mouth daily      cholecalciferol (VITAMIN D3) 1,000 units tablet Take 1 tablet (1,000 Units total) by mouth daily 30 tablet 3    cholecalciferol (VITAMIN D3) 400 units tablet Take 400 Units by mouth daily      meclizine (ANTIVERT) 25 mg tablet Take 1 tablet (25 mg total) by mouth 3 (three) times a day as needed for dizziness 30 tablet 1    omeprazole (PriLOSEC) 40 MG capsule Take 1 capsule (40 mg total) by mouth daily 30 capsule 3    zinc gluconate 50 mg tablet Take 50 mg by mouth daily       No current facility-administered medications for this visit           Health Maintenance     Health Maintenance   Topic Date Due    Influenza Vaccine (1) 06/30/2022 (Originally 9/1/2021)    Annual Physical  11/17/2022    Depression Screening  02/02/2023    BMI: Followup Plan  02/16/2023    BMI: Adult  04/11/2023    Colorectal Cancer Screening  04/03/2024    DTaP,Tdap,and Td Vaccines (2 - Td or Tdap) 11/17/2031    HIV Screening  Completed    Hepatitis C Screening  Completed    COVID-19 Vaccine  Completed    Pneumococcal Vaccine: Pediatrics (0 to 5 Years) and At-Risk Patients (6 to 59 Years)  Aged Out    HIB Vaccine  Aged Out    Hepatitis B Vaccine  Aged Out    IPV Vaccine  Aged Out    Hepatitis A Vaccine  Aged Out    Meningococcal ACWY Vaccine  Aged Out    HPV Vaccine  Aged [employer] History   Administered Date(s) Administered    COVID-19 J&J (Charge Payment) vaccine 0 5 mL 04/12/2021, 01/08/2022    Tdap 11/17/2021        Recent Results (from the past 336 hour(s))   Tissue Exam    Collection Time: 04/04/22 10:32 AM Result Value Ref Range    Case Report       Surgical Pathology Report                         Case: [id number]                                   Authorizing Provider:  Danna Black MD       Collected:           04/04/2022 1032              Ordering Location:     [facility]   Received:            04/04/2022 [address] [facility]                                                                       Pathologist:           Jovon Bustillo MD                                                     Specimens:   A) - Duodenum, Bx Duodenum, r/o Celiac                                                              B) - Stomach, Gastric bx, r/o h  pylori                                                             C) - Esophagus, Bx Lower Esophagus, r/o EOE                                                         D) - Esophagus, Bx Mid Esophagus, r/o EOE                                                           E) - Large Intestine, Cecum, Cecal bx, hx Crohn's, r/o dysplasia                                     F) - Large Intestine, Right/Ascending Colon, Bx Ascending Colon, hx Crohn's, r/o                    Dysplasia                                                                                           G) - Large Intestine, Transverse Colon, Bx Transverse Colon, hx Crohn's, r/o                        Dysplasia                                                                                           H) - Large Intestine, Left/Descending Colon, Bx Descending Colon, hx Crohn's, r/o                   Dysplasia                                                                                           I) - Large Intestine, Sigmoid Colon, Sigmoid bx, hx Crohn's, r/o Dysplasia                          J) - Rectum, Rectosigmoid bx, hx Crohn's, r/o dysplasia                                             K) - Rectum, Rectal bx, hx Crohn's, r/o dysplasia Final Diagnosis       A  Duodenum, Bx Duodenum, r/o Celiac:    - Duodenal mucosa with patchy increased intraepithelial lymphocytes and foveolar metaplasia  See comment     - No villous blunting is seen  - Negative for dysplasia  B  Stomach, Gastric bx, r/o h  pylori:  -  Chronic inactive oxyntic and antral gastritis  -  Negative for Helicobacter pylori, by H&E stain   -  Negative for atrophy, intestinal metaplasia, dysplasia or carcinoma  C  Esophagus, Bx Lower Esophagus, r/o EOE:   -  Squamous mucosa with non specific reactive changes    -  No increase in intraepithelial eosinophils is seen  -  Negative for dysplasia or malignancy  D  Esophagus, Bx Mid Esophagus, r/o EOE:   -  Squamous mucosa with non specific reactive changes    -  No increase in intraepithelial eosinophils is seen  -  Negative for dysplasia or malignancy  E  Large Intestine, Cecum, Cecal bx, hx Crohn's, r/o dysplasia:  - Benign colonic mucosa  - Negative for active colitis or dysplasia  F  Large Intestine, Right/Ascending Colon, Bx Ascending Colon, hx Crohn's, r/o Dysplasia:  - Benign colonic mucosa with lymphoid aggregate  - Negative for active colitis or dysplasia  G  Large Intestine, Transverse Colon, Bx Transverse Colon, hx Crohn's, r/o Dysplasia:  - Benign colonic mucosa with lymphoid aggregate  - Negative for active colitis or dysplasia  H  Large Intestine, Left/Descending Colon, Bx Descending Colon, hx Crohn's, r/o Dysplasia:  - Benign colonic mucosa with lymphoid aggregate  - Negative for active colitis or dysplasia  I  Large Intestine, Sigmoid Colon, Sigmoid bx, hx Crohn's, r/o Dysplasia:  - Benign colonic mucosa with mild architectural distortion   - Negative for active colitis or dysplasia  J  Rectum, Rectosigmoid bx, hx Crohn's, r/o dysplasia:  - Benign colonic mucosa with mild architectural distortion   - Negative for active colitis or dysplasia       K  Rectum, Rectal bx, hx Crohn's, r/o dysplasia:  - Benign colonic mucosa with mild architectural distortion   - Negative for active colitis or dysplasia  Comment A:  This is a histologically nonspecific diagnosis with a broad differential diagnosis including gluten-sensitive enteropathy (celiac sprue), bacterial overgrowth, other enteric infection, autoimmune disease, inflammatory bowel disease, drug (NSAID) reaction, and gastric infection with Helicobacter pylori  Correlation with patient history and additional clinical laboratory testing (antibody studies) may be helpful for interpretation of the results  References:    Brendan ST and Jeffrey BRADFORD  The clinical significance of duodenal lymphocytosis with normal villus architecture  Archives of Pathology and Laboratory Medicine 2013;137(9):1216-9  Tiffany Ramirez et al , Intraepithelial lymphocytosis in architecturally Preserved Proximal Small Intestinal Mucosa - An Increasing Diagnostic Problem with a Wide Differential Diagnosis  Archives of Pathology and Laboratory Medicine 2006;130(7):1020-5  Additional Information       All reported additional testing was performed with appropriately reactive controls  These tests were developed and their performance characteristics determined by [address] [place] [address] [facility] or appropriate performing facility, though some tests may be performed on tissues which have not been validated for performance characteristics (such as staining performed on alcohol exposed cell blocks and decalcified tissues)  Results should be interpreted with caution and in the context of the patients clinical condition  These tests may not be cleared or approved by the [place]  Food and Drug Administration, though the FDA has determined that such clearance or approval is not necessary  These tests are used for clinical purposes and they should not be regarded as investigational or for research   This laboratory has been approved by CLIA 88, designated as a high-complexity laboratory and is qualified to perform these tests  Interpretation performed at [facility], [address] [place] [id number]  Gross Description          A  The specimen is received in formalin, labeled with the patient's name and hospital number, and is designated "duodenal biopsy rule out celiac"  The specimen consists of 6 tan-pink, focally friable soft tissue fragments ranging from 0 1-0 6 cm in greatest dimension  Entirely submitted  Screened cassette  B  The specimen is received in formalin, labeled with the patient's name and hospital number, and is designated "gastric biopsy rule out H pylori  The specimen consists of 2 tan-pink soft tissue fragments measuring 0 4 and 0 8 cm in greatest dimension  Entirely submitted  Screened cassette  C  The specimen is received in formalin, labeled with the patient's name and hospital number, and is designated "lower esophageal biopsy rule out eosinophilic esophagitis"  The specimen consists of 4 tan, semi translucent and friable soft tissue fragments ranging from 0 1-0 5 cm in greatest dimension  Entirely submitted  Screened cassette  D  The specimen is received in formalin, labeled with the patient's name and hospital number, and is designated "mid esophageal biopsy rule out eosinophilic esophagitis  The specimen consists of 3 tan-pink, semi translucent and friable soft tissue fragments ranging from 0 3-0 4 cm in greatest dimension  Entirely submitted  Screened cassette  E  The specimen is received in formalin, labeled with the patient's name and hospital number, and is designated "go biopsy, history of Crohn's rule out dysplasia  The specimen consists of 2 tan-pink, focally friable soft tissue fragments measuring 0 3 and 0 5 cm in greatest dimension  Entirely submitted  Screened cassette  F   The specimen is received in formalin, labeled with the patient's name and hospital number, and is designated "ascending colon biopsy, history of Crohn's rule out dysplasia  The specimen consists of 3 tan-pink, focally friable soft tissue fragments ranging from 0 2-0 3 cm in greatest dimension  Entirely submitted  Screened cassette  G  The specimen is received in formalin, labeled with the patient's name and hospital number, and is designated "transverse colon biopsy, history of Crohn's rule out dysplasia"  The specimen consists of 3 tan-red, focally friable soft tissue fragments ranging from 0 3-0 5 cm in greatest dimension  Entirely submitted  Screened cassette  H  The specimen is received in formalin, labeled with the patient's name and hospital number, and is designated "descending colon biopsy, history of Crohn's rule out dysplasia  The specimen consists of 5 tan-red, semi translucent soft tissue fragments ranging from 0 2-0 3 cm in greatest dimension  Entirely submitted  Screened cassette  I  The specimen is received in formalin, labeled with the patient's name and hospital number, and is designated "sigmoid colon biopsy, history of Crohn's rule out dysplasia  The specimen consists of 4 tan-red, semi translucent and friable soft tissue fragments ranging from 0 1-0 6 cm in greatest dimension  Entirely submitted  Screened cassette  J  The specimen is received in formalin, labeled with the patient's name and hospital number, and is designated "rectosigmoid colon biopsy, history of Crohn's rule out dysplasia"  The specimen consists of 3 tan-red soft tissue fragments ranging from 0 2-0 3 cm in greatest dimension  Entirely submitted  Screened cassette  K  The specimen is received in formalin, labeled with the patient's name and hospital number, and is designated "rectal biopsy, history of Crohn's rule out dysplasia"  The specimen consists of 5 tan-pink, focally friable soft tissue fragments ranging from 0 2-0 4 cm in greatest dimension  Entirely submitted   Screened cassette  Note: The estimated total formalin fixation time based upon information provided by the submitting clinician and the standard processing schedule is under 72 hours  Gypsy          POCT blood glucose    Collection Time: 04/11/22  2:48 PM   Result Value Ref Range    GLUCOSE     POCT hemoglobin A1c    Collection Time: 04/11/22  2:54 PM   Result Value Ref Range    Hemoglobin A1C 6 7 (A) 6 5   HEMOGLOBIN A1C W/ EAG ESTIMATION    Collection Time: 04/11/22  3:27 PM   Result Value Ref Range    Hemoglobin A1C 6 5 (H) Normal 3 8-5 6%; PreDiabetic 5 7-6 4%;  Diabetic >=6 5%; Glycemic control for adults with diabetes <7 0% %     mg/dl   Comprehensive metabolic panel    Collection Time: 04/11/22  3:27 PM   Result Value Ref Range    Sodium 141 136 - 145 mmol/L    Potassium 4 1 3 5 - 5 3 mmol/L    Chloride 105 100 - 108 mmol/L    CO2 27 21 - 32 mmol/L    ANION GAP 9 4 - 13 mmol/L    BUN 14 5 - 25 mg/dL    Creatinine 1 29 0 60 - 1 30 mg/dL    Glucose 102 65 - 140 mg/dL    Calcium 9 0 8 3 - 10 1 mg/dL    AST 16 5 - 45 U/L    ALT 28 12 - 78 U/L    Alkaline Phosphatase 89 46 - 116 U/L    Total Protein 7 9 6 4 - 8 2 g/dL    Albumin 3 9 3 5 - 5 0 g/dL    Total Bilirubin 0 31 0 20 - 1 00 mg/dL    eGFR 62 ml/min/1 73sq m       BRI Hurt

## 2022-04-17 PROBLEM — R79.89 ELEVATED SERUM CREATININE: Status: ACTIVE | Noted: 2022-04-17

## 2022-04-17 PROBLEM — R73.09 ELEVATED GLUCOSE: Status: ACTIVE | Noted: 2022-04-17

## 2022-04-17 PROBLEM — R42 DIZZINESS: Status: RESOLVED | Noted: 2022-02-16 | Resolved: 2022-04-17

## 2022-04-21 ENCOUNTER — APPOINTMENT (OUTPATIENT)
Dept: LAB | Facility: CLINIC | Age: 52
End: 2022-04-21
Payer: COMMERCIAL

## 2022-04-21 DIAGNOSIS — K50.919 CROHN'S DISEASE WITH COMPLICATION, UNSPECIFIED GASTROINTESTINAL TRACT LOCATION (HCC): ICD-10-CM

## 2022-04-21 PROCEDURE — 82784 ASSAY IGA/IGD/IGG/IGM EACH: CPT

## 2022-04-21 PROCEDURE — 86364 TISS TRNSGLTMNASE EA IG CLAS: CPT

## 2022-04-21 PROCEDURE — 36415 COLL VENOUS BLD VENIPUNCTURE: CPT

## 2022-04-21 PROCEDURE — 86231 EMA EACH IG CLASS: CPT

## 2022-04-21 PROCEDURE — 86258 DGP ANTIBODY EACH IG CLASS: CPT

## 2022-04-23 LAB
ENDOMYSIUM IGA SER QL: NEGATIVE
GLIADIN PEPTIDE IGA SER-ACNC: 39 UNITS (ref 0–19)
GLIADIN PEPTIDE IGG SER-ACNC: 2 UNITS (ref 0–19)
IGA SERPL-MCNC: 304 MG/DL (ref 90–386)
TTG IGA SER-ACNC: <2 U/ML (ref 0–3)
TTG IGG SER-ACNC: 3 U/ML (ref 0–5)

## 2022-05-05 ENCOUNTER — TELEPHONE (OUTPATIENT)
Dept: FAMILY MEDICINE CLINIC | Facility: CLINIC | Age: 52
End: 2022-05-05

## 2022-05-05 NOTE — TELEPHONE ENCOUNTER
Patients wife called and was asking about a covid travel swab that he would need done on August 21, 2022 but that is on Sunday  Can you put in an order for the patient to get it done at a [facility] center? Please advise

## 2022-07-06 ENCOUNTER — RA CDI HCC (OUTPATIENT)
Dept: OTHER | Facility: HOSPITAL | Age: 52
End: 2022-07-06

## 2022-07-06 NOTE — PROGRESS NOTES
[facility]  coding opportunities       Chart reviewed, no opportunity found: CHART REVIEWED, NO OPPORTUNITY FOUND        Patients Insurance        Commercial Insurance: [employer]

## 2022-07-11 ENCOUNTER — TELEPHONE (OUTPATIENT)
Dept: FAMILY MEDICINE CLINIC | Facility: CLINIC | Age: 52
End: 2022-07-11

## 2022-07-12 ENCOUNTER — OFFICE VISIT (OUTPATIENT)
Dept: FAMILY MEDICINE CLINIC | Facility: CLINIC | Age: 52
End: 2022-07-12
Payer: COMMERCIAL

## 2022-07-12 VITALS
TEMPERATURE: 96.8 F | HEART RATE: 64 BPM | BODY MASS INDEX: 27.8 KG/M2 | DIASTOLIC BLOOD PRESSURE: 82 MMHG | WEIGHT: 183.4 LBS | HEIGHT: 68 IN | SYSTOLIC BLOOD PRESSURE: 120 MMHG | RESPIRATION RATE: 16 BRPM | OXYGEN SATURATION: 97 %

## 2022-07-12 DIAGNOSIS — R73.09 ELEVATED GLUCOSE: ICD-10-CM

## 2022-07-12 DIAGNOSIS — R42 VERTIGO: Primary | ICD-10-CM

## 2022-07-12 DIAGNOSIS — R79.89 ELEVATED SERUM CREATININE: ICD-10-CM

## 2022-07-12 DIAGNOSIS — K50.90 CROHN'S DISEASE WITHOUT COMPLICATION, UNSPECIFIED GASTROINTESTINAL TRACT LOCATION (HCC): ICD-10-CM

## 2022-07-12 DIAGNOSIS — R42 VERTIGO: ICD-10-CM

## 2022-07-12 PROBLEM — R41.840 DIFFICULTY CONCENTRATING: Status: RESOLVED | Noted: 2022-02-16 | Resolved: 2022-07-12

## 2022-07-12 PROBLEM — G89.29 CHRONIC RIGHT-SIDED LOW BACK PAIN WITH RIGHT-SIDED SCIATICA: Status: RESOLVED | Noted: 2021-11-17 | Resolved: 2022-07-12

## 2022-07-12 PROBLEM — M54.41 CHRONIC RIGHT-SIDED LOW BACK PAIN WITH RIGHT-SIDED SCIATICA: Status: RESOLVED | Noted: 2021-11-17 | Resolved: 2022-07-12

## 2022-07-12 LAB — SL AMB POCT HEMOGLOBIN AIC: 6.1 (ref ?–6.5)

## 2022-07-12 PROCEDURE — 83036 HEMOGLOBIN GLYCOSYLATED A1C: CPT | Performed by: NURSE PRACTITIONER

## 2022-07-12 PROCEDURE — 99214 OFFICE O/P EST MOD 30 MIN: CPT | Performed by: NURSE PRACTITIONER

## 2022-07-12 RX ORDER — MECLIZINE HYDROCHLORIDE 25 MG/1
25 TABLET ORAL 3 TIMES DAILY PRN
Qty: 30 TABLET | Refills: 0 | Status: SHIPPED | OUTPATIENT
Start: 2022-07-12 | End: 2022-07-12 | Stop reason: SDUPTHER

## 2022-07-12 RX ORDER — MECLIZINE HYDROCHLORIDE 25 MG/1
25 TABLET ORAL 3 TIMES DAILY PRN
Qty: 30 TABLET | Refills: 1 | Status: SHIPPED | OUTPATIENT
Start: 2022-07-12 | End: 2022-07-12 | Stop reason: SDUPTHER

## 2022-07-12 RX ORDER — MECLIZINE HYDROCHLORIDE 25 MG/1
25 TABLET ORAL 3 TIMES DAILY PRN
Qty: 30 TABLET | Refills: 0 | Status: SHIPPED | OUTPATIENT
Start: 2022-07-12

## 2022-07-12 NOTE — PROGRESS NOTES
FAMILY PRACTICE OFFICE VISIT       NAME: Geri Stanton  AGE: 46 y o  SEX: male       : 1970        MRN: [id number]    DATE: 2022  TIME: 12:45 PM    Assessment and Plan   1  Vertigo  Assessment & Plan:  Refill meds      Orders:  -     meclizine (ANTIVERT) 25 mg tablet; Take 1 tablet (25 mg total) by mouth 3 (three) times a day as needed for dizziness    2  Elevated glucose  Assessment & Plan:  Monitor      Orders:  -     POCT hemoglobin A1c  -     Comprehensive metabolic panel; Future    3  Elevated serum creatinine  Assessment & Plan:  Recheck labs      Orders:  -     Comprehensive metabolic panel; Future    4  Crohn's disease without complication, unspecified gastrointestinal tract location [facility])  Assessment & Plan:  Cont f/u with GI                     Chief Complaint     Chief Complaint   Patient presents with    Follow-up     F/u to labs  Chrohns, vertigo       History of Present Illness   Geri Stanton is a 46y o -year-old male who is here for f/u to chronic medical conditions  Gets vertigo on and off, would like refill of meclizine  Reviewed information from GI  May have celiac, inconclusive labs  Discussed gluten free diet with patient  To f/u with GI  hgba1c 6 1 today in office  To continue to monitor for diabetes  Overall feels well  Labs reviewed from last visit      Review of Systems   Review of Systems   Constitutional: Negative for fatigue and fever  HENT: Negative for congestion, postnasal drip and rhinorrhea  Eyes: Negative for photophobia and visual disturbance  Respiratory: Negative for cough, shortness of breath and wheezing  Cardiovascular: Negative for chest pain and palpitations  Gastrointestinal: Negative for constipation, diarrhea, nausea and vomiting  Genitourinary: Negative for dysuria and frequency  Musculoskeletal: Negative for arthralgias and myalgias  Skin: Negative for rash  Neurological: Negative for dizziness, light-headedness and headaches  Psychiatric/Behavioral: Negative for dysphoric mood and sleep disturbance  The patient is not nervous/anxious  Active Problem List     Patient Active Problem List   Diagnosis    Crohn's disease without complication ([place] [address] )    Gastroesophageal reflux disease    Vertigo    Elevated serum creatinine    Elevated glucose         Past Medical History:  Past Medical History:   Diagnosis Date    Colitis     Colon polyp     [facility] ([facility] )        Past Surgical History:  Past Surgical History:   Procedure Laterality Date    BACK SURGERY      lumbar discectomy     COLON SURGERY  2002    11" inrestines removed      COLONOSCOPY      SKULL FRACTURE ELEVATION         Family History:  Family History   Problem Relation Age of Onset    Coronary artery disease Mother     Diabetes Mother     Coronary artery disease Father        Social History:  Social History     Socioeconomic History    Marital status: Single     Spouse name: Not on file    Number of children: Not on file    Years of education: Not on file    Highest education level: Not on file   Occupational History    Not on file   Tobacco Use    Smoking status: Never Smoker    Smokeless tobacco: Never Used   Vaping Use    Vaping Use: Never used   Substance and Sexual Activity    Alcohol use: Yes     Comment: socially    Drug use: Yes     Types: Marijuana    Sexual activity: Yes     Partners: Female   Other Topics Concern    Not on file   Social History Narrative    Not on file     Social Determinants of Health     Financial Resource Strain: Not on file   Food Insecurity: Not on file   Transportation Needs: Not on file   Physical Activity: Not on file   Stress: Not on file   Social Connections: Not on file   Intimate Partner Violence: Not on file   Housing Stability: Not on file       Objective     Vitals:    07/12/22 1048   BP: 120/82   Pulse: 64   Resp: 16   Temp: (!) 96 8 °F (36 °C)   SpO2: 97%     Wt Readings from Last 3 Encounters: 07/12/22 83 2 kg (183 lb 6 4 oz)   04/11/22 82 1 kg (181 lb)   02/02/22 84 2 kg (185 lb 9 6 oz)       Physical Exam  Vitals and nursing note reviewed  Constitutional:       Appearance: Normal appearance  HENT:      Head: Normocephalic and atraumatic  Right Ear: Tympanic membrane, ear canal and external ear normal       Left Ear: Tympanic membrane, ear canal and external ear normal       Nose: Nose normal       Mouth/Throat:      Mouth: Mucous membranes are moist    Eyes:      Conjunctiva/sclera: Conjunctivae normal    Cardiovascular:      Rate and Rhythm: Normal rate and regular rhythm  Pulses: Normal pulses  Heart sounds: Normal heart sounds  Pulmonary:      Effort: Pulmonary effort is normal       Breath sounds: Normal breath sounds  Abdominal:      General: Bowel sounds are normal       Palpations: Abdomen is soft  Musculoskeletal:         General: Normal range of motion  Cervical back: Normal range of motion and neck supple  Skin:     General: Skin is warm  Capillary Refill: Capillary refill takes less than 2 seconds  Neurological:      General: No focal deficit present  Mental Status: He is alert and oriented to person, place, and time     Psychiatric:         Mood and Affect: Mood normal          Behavior: Behavior normal          Pertinent Laboratory/Diagnostic Studies:  Lab Results   Component Value Date    BUN 14 04/11/2022    CREATININE 1 29 04/11/2022    CALCIUM 9 0 04/11/2022    K 4 1 04/11/2022    CO2 27 04/11/2022     04/11/2022     Lab Results   Component Value Date    ALT 28 04/11/2022    AST 16 04/11/2022    ALKPHOS 89 04/11/2022       Lab Results   Component Value Date    WBC 3 94 (L) 12/12/2021    HGB 14 5 12/12/2021    HCT 43 6 12/12/2021    MCV 89 12/12/2021     12/12/2021       No results found for: TSH    No results found for: CHOL  Lab Results   Component Value Date    TRIG 61 12/12/2021     Lab Results   Component Value Date    HDL 53 12/12/2021     Lab Results   Component Value Date    LDLCALC 98 12/12/2021     Lab Results   Component Value Date    HGBA1C 6 1 07/12/2022       Results for orders placed or performed in visit on 07/12/22   POCT hemoglobin A1c   Result Value Ref Range    Hemoglobin A1C 6 1 6 5       Orders Placed This Encounter   Procedures    Comprehensive metabolic panel    POCT hemoglobin A1c       ALLERGIES:  No Known Allergies    Current Medications     Current Outpatient Medications   Medication Sig Dispense Refill    Ascorbic Acid (vitamin C) 100 MG tablet Take 100 mg by mouth daily      cholecalciferol (VITAMIN D3) 1,000 units tablet Take 1 tablet (1,000 Units total) by mouth daily 30 tablet 3    cholecalciferol (VITAMIN D3) 400 units tablet Take 400 Units by mouth daily      meclizine (ANTIVERT) 25 mg tablet Take 1 tablet (25 mg total) by mouth 3 (three) times a day as needed for dizziness 30 tablet 0    omeprazole (PriLOSEC) 40 MG capsule Take 1 capsule (40 mg total) by mouth daily 30 capsule 3    zinc gluconate 50 mg tablet Take 50 mg by mouth daily       No current facility-administered medications for this visit           Health Maintenance     Health Maintenance   Topic Date Due    COVID-19 Vaccine (3 - Booster for Leidy series) 05/08/2022    Influenza Vaccine (1) 09/01/2022    Annual Physical  11/17/2022    Depression Screening  02/02/2023    BMI: Followup Plan  02/16/2023    BMI: Adult  07/12/2023    Colorectal Cancer Screening  04/03/2024    DTaP,Tdap,and Td Vaccines (2 - Td or Tdap) 11/17/2031    HIV Screening  Completed    Hepatitis C Screening  Completed    Pneumococcal Vaccine: Pediatrics (0 to 5 Years) and At-Risk Patients (6 to 59 Years)  Aged Out    HIB Vaccine  Aged Out    Hepatitis B Vaccine  Aged Out    IPV Vaccine  Aged Out    Hepatitis A Vaccine  Aged Out    Meningococcal ACWY Vaccine  Aged Out    HPV Vaccine  Aged [employer] History   Administered Date(s) Administered  COVID-19 J&J ([employer]) vaccine 0 5 mL 04/12/2021, 01/08/2022    Tdap 11/17/2021         Recent Results (from the past 168 hour(s))   POCT hemoglobin A1c    Collection Time: 07/12/22 11:37 AM   Result Value Ref Range    Hemoglobin A1C 6 1 6 5         BRI Mccormick

## 2022-08-09 ENCOUNTER — OFFICE VISIT (OUTPATIENT)
Dept: GASTROENTEROLOGY | Facility: AMBULARY SURGERY CENTER | Age: 52
End: 2022-08-09
Payer: COMMERCIAL

## 2022-08-09 ENCOUNTER — APPOINTMENT (OUTPATIENT)
Dept: LAB | Facility: CLINIC | Age: 52
End: 2022-08-09
Payer: COMMERCIAL

## 2022-08-09 VITALS
OXYGEN SATURATION: 96 % | DIASTOLIC BLOOD PRESSURE: 60 MMHG | WEIGHT: 179.8 LBS | BODY MASS INDEX: 27.25 KG/M2 | HEART RATE: 68 BPM | SYSTOLIC BLOOD PRESSURE: 118 MMHG | HEIGHT: 68 IN

## 2022-08-09 DIAGNOSIS — R73.09 ELEVATED GLUCOSE: ICD-10-CM

## 2022-08-09 DIAGNOSIS — K21.9 GASTROESOPHAGEAL REFLUX DISEASE, UNSPECIFIED WHETHER ESOPHAGITIS PRESENT: ICD-10-CM

## 2022-08-09 DIAGNOSIS — K50.90 CROHN'S DISEASE WITHOUT COMPLICATION, UNSPECIFIED GASTROINTESTINAL TRACT LOCATION (HCC): Primary | ICD-10-CM

## 2022-08-09 DIAGNOSIS — R79.89 ELEVATED SERUM CREATININE: ICD-10-CM

## 2022-08-09 DIAGNOSIS — K50.90 CROHN'S DISEASE WITHOUT COMPLICATION, UNSPECIFIED GASTROINTESTINAL TRACT LOCATION (HCC): ICD-10-CM

## 2022-08-09 LAB
25(OH)D3 SERPL-MCNC: 19 NG/ML (ref 30–100)
ALBUMIN SERPL BCP-MCNC: 4.3 G/DL (ref 3.5–5)
ALP SERPL-CCNC: 66 U/L (ref 34–104)
ALT SERPL W P-5'-P-CCNC: 14 U/L (ref 7–52)
ANION GAP SERPL CALCULATED.3IONS-SCNC: 5 MMOL/L (ref 4–13)
AST SERPL W P-5'-P-CCNC: 16 U/L (ref 13–39)
BILIRUB SERPL-MCNC: 0.28 MG/DL (ref 0.2–1)
BUN SERPL-MCNC: 14 MG/DL (ref 5–25)
CALCIUM SERPL-MCNC: 9.2 MG/DL (ref 8.4–10.2)
CHLORIDE SERPL-SCNC: 109 MMOL/L (ref 96–108)
CO2 SERPL-SCNC: 28 MMOL/L (ref 21–32)
CREAT SERPL-MCNC: 1.09 MG/DL (ref 0.6–1.3)
GFR SERPL CREATININE-BSD FRML MDRD: 77 ML/MIN/1.73SQ M
GLUCOSE P FAST SERPL-MCNC: 93 MG/DL (ref 65–99)
POTASSIUM SERPL-SCNC: 4.3 MMOL/L (ref 3.5–5.3)
PROT SERPL-MCNC: 6.7 G/DL (ref 6.4–8.4)
SODIUM SERPL-SCNC: 142 MMOL/L (ref 135–147)
VIT B12 SERPL-MCNC: 334 PG/ML (ref 100–900)

## 2022-08-09 PROCEDURE — 81376 HLA II TYPING 1 LOCUS LR: CPT

## 2022-08-09 PROCEDURE — 99214 OFFICE O/P EST MOD 30 MIN: CPT | Performed by: INTERNAL MEDICINE

## 2022-08-09 PROCEDURE — 82607 VITAMIN B-12: CPT

## 2022-08-09 PROCEDURE — 82306 VITAMIN D 25 HYDROXY: CPT

## 2022-08-09 PROCEDURE — 36415 COLL VENOUS BLD VENIPUNCTURE: CPT

## 2022-08-09 PROCEDURE — 80053 COMPREHEN METABOLIC PANEL: CPT

## 2022-08-09 PROCEDURE — 81382 HLA II TYPING 1 LOC HR: CPT

## 2022-08-09 NOTE — LETTER
August 9, 2022     Tory Bland [address] [address] [address]  [address] [address]    Patient: Dudley Lake   YOB: 1970   Date of Visit: 8/9/2022       Dear Dr Sally Fleming:    Thank you for referring Dudley Lake to me for evaluation  Below are my notes for this consultation  If you have questions, please do not hesitate to call me  I look forward to following your patient along with you  Sincerely,        Lennie Brooke MD        CC: No Recipients  Lennie Brooke MD  8/9/2022  5:39 PM  Sign when Signing Visit  [facility]  Progress Note - Dudley Lake 46 y o  male MRN: [id number]    Unit/Bed#:  Encounter: [id number]    Assessment/Plan:    1  Increased intraepithelial lymphocytosis on the duodenal biopsies//elevation of the gluten IgA-concerning for undiagnosed celiac disease, villous blunting was not noted however  Patient also carries diagnosis of Crohn's disease and is currently off any medications  He is asymptomatic   -would recommend celiac HLA DQ testing, if this is negative, this exclude celiac disease however this is positive, highly suggestive of celiac disease given increased intraepithelial lymphocytosis and elevation of the glaidin IgA  I would recommend gluten free diet at that time  2  Crohn's disease-colonoscopy was unremarkable, biopsies were negative for active disease or dysplastic changes  Repeat colonoscopy at 2 year interval   Would recommend small bowel evaluation with small bowel series  This was ordered but not done yet  Vitamin-D levels were previously mildly decreased at 23, would recommend supplemental vitamin-D and repeating at 3 month interval     Continue off of treatment  Recommend flu vaccine once available  Pneumovax once available  Patient has received COVID-19 vaccine        3  GERD-no evidence of esophageal strictures, no evidence of eosinophilic esophagitis, no H pylori or intestinal metaplasia  -continue omeprazole 40 milligrams on daily basis, can plan on titrating this in the future  Continue to follow anti-reflux measures  Avoid NSAIDs  4  Follow-up in 6 months  Subjective: This is a 49-year-old male with history of Crohn's disease status post are section years ago, in remission, gastritis presents for follow-up  Patient recently underwent EGD and colonoscopy  EGD was notable for mild gastritis  Colonoscopy was notable for normal appearing colonic mucosa a small internal hemorrhoids  TI could not be intubated  Patient was subsequently recommended small-bowel follow-through to assess the small bowel  Biopsies were negative for dysplasia  Patient was recommended a repeat colonoscopy at 2 year interval   Duodenal biopsies were notable for increased intraepithelial lymphocytosis and foveolar metaplasia without any villous blunting or dysplasia  Subsequent celiac serologies were notable for elevation of the gliadin IgA  Patient is asymptomatic  He has not started a gluten free diet  He denies any change in bowel habits, hematochezia, abdominal pain or unintentional weight loss  No symptoms of acid reflux and dysphagia, odynophagia, loss of appetite or early satiety  Patient was unable to go for the small-bowel series  he is asymptomatic at this time  He is not following gluten free diet  He reports regular bowel movements which are nonbloody, denies any nocturnal symptoms, denies any urgency or tenesmus  Denies abdominal pain  No symptoms of acid reflux, dysphagia, odynophagia loss of appetite or early satiety  Reports that acid reflux is controlled with omeprazole 40 milligrams that he takes on daily basis  Objective:     Vitals: Blood pressure 118/60, pulse 68, height 5' 8" (1 727 m), weight 81 6 kg (179 lb 12 8 oz), SpO2 96 %  ,Body mass index is 27 34 kg/m²      [unfilled]    Physical Exam:    GEN: wn/wd, NAD  HEENT: MMM, no cervical or supraclavicular LAD, anciteric  CV: RRR, no m/r/g  CHEST: CTA b/l, no w/r/r  ABD: +BS, soft, NT/ND, no hepatosplenomegaly  EXT: no c/c/e  SKIN: no rashes  NEURO: aaox3      Invasive Devices  Report    None                         Lab, Imaging and other studies:     No visits with results within 1 Day(s) from this visit  Latest known visit with results is:   Office Visit on 07/12/2022   Component Date Value    Hemoglobin A1C 07/12/2022 6 1          I have personally reviewed pertinent films in PACS    No current facility-administered medications for this visit

## 2022-08-09 NOTE — PROGRESS NOTES
[facility]  Progress Note - Cresencio Brannon 46 y o  male MRN: [id number]    Unit/Bed#:  Encounter: [id number]    Assessment/Plan:    1  Increased intraepithelial lymphocytosis on the duodenal biopsies//elevation of the gluten IgA-concerning for undiagnosed celiac disease, villous blunting was not noted however  Patient also carries diagnosis of Crohn's disease and is currently off any medications  He is asymptomatic   -would recommend celiac HLA DQ testing, if this is negative, this exclude celiac disease however this is positive, highly suggestive of celiac disease given increased intraepithelial lymphocytosis and elevation of the glaidin IgA  I would recommend gluten free diet at that time  2  Crohn's disease-colonoscopy was unremarkable, biopsies were negative for active disease or dysplastic changes  Repeat colonoscopy at 2 year interval   Would recommend small bowel evaluation with small bowel series  This was ordered but not done yet  Vitamin-D levels were previously mildly decreased at 23, would recommend supplemental vitamin-D and repeating at 3 month interval     Continue off of treatment  Recommend flu vaccine once available  Pneumovax once available  Patient has received COVID-19 vaccine  3  GERD-no evidence of esophageal strictures, no evidence of eosinophilic esophagitis, no H pylori or intestinal metaplasia  -continue omeprazole 40 milligrams on daily basis, can plan on titrating this in the future  Continue to follow anti-reflux measures  Avoid NSAIDs  4  Follow-up in 6 months  Subjective: This is a 49-year-old male with history of Crohn's disease status post are section years ago, in remission, gastritis presents for follow-up  Patient recently underwent EGD and colonoscopy  EGD was notable for mild gastritis  Colonoscopy was notable for normal appearing colonic mucosa a small internal hemorrhoids  TI could not be intubated    Patient was subsequently recommended small-bowel follow-through to assess the small bowel  Biopsies were negative for dysplasia  Patient was recommended a repeat colonoscopy at 2 year interval   Duodenal biopsies were notable for increased intraepithelial lymphocytosis and foveolar metaplasia without any villous blunting or dysplasia  Subsequent celiac serologies were notable for elevation of the gliadin IgA  Patient is asymptomatic  He has not started a gluten free diet  He denies any change in bowel habits, hematochezia, abdominal pain or unintentional weight loss  No symptoms of acid reflux and dysphagia, odynophagia, loss of appetite or early satiety  Patient was unable to go for the small-bowel series  he is asymptomatic at this time  He is not following gluten free diet  He reports regular bowel movements which are nonbloody, denies any nocturnal symptoms, denies any urgency or tenesmus  Denies abdominal pain  No symptoms of acid reflux, dysphagia, odynophagia loss of appetite or early satiety  Reports that acid reflux is controlled with omeprazole 40 milligrams that he takes on daily basis  Objective:     Vitals: Blood pressure 118/60, pulse 68, height 5' 8" (1 727 m), weight 81 6 kg (179 lb 12 8 oz), SpO2 96 %  ,Body mass index is 27 34 kg/m²  [unfilled]    Physical Exam:    GEN: wn/wd, NAD  HEENT: MMM, no cervical or supraclavicular LAD, anciteric  CV: RRR, no m/r/g  CHEST: CTA b/l, no w/r/r  ABD: +BS, soft, NT/ND, no hepatosplenomegaly  EXT: no c/c/e  SKIN: no rashes  NEURO: aaox3      Invasive Devices  Report    None                         Lab, Imaging and other studies:     No visits with results within 1 Day(s) from this visit  Latest known visit with results is:   Office Visit on 07/12/2022   Component Date Value    Hemoglobin A1C 07/12/2022 6 1          I have personally reviewed pertinent films in PACS    No current facility-administered medications for this visit

## 2022-08-21 DIAGNOSIS — Z11.9 ENCOUNTER FOR SCREENING FOR INFECTIOUS AND PARASITIC DISEASES, UNSPECIFIED: ICD-10-CM

## 2022-08-21 PROCEDURE — U0003 INFECTIOUS AGENT DETECTION BY NUCLEIC ACID (DNA OR RNA); SEVERE ACUTE RESPIRATORY SYNDROME CORONAVIRUS 2 (SARS-COV-2) (CORONAVIRUS DISEASE [COVID-19]), AMPLIFIED PROBE TECHNIQUE, MAKING USE OF HIGH THROUGHPUT TECHNOLOGIES AS DESCRIBED BY CMS-2020-01-R: HCPCS | Performed by: NURSE PRACTITIONER

## 2022-08-21 PROCEDURE — U0005 INFEC AGEN DETEC AMPLI PROBE: HCPCS | Performed by: NURSE PRACTITIONER

## 2022-08-22 LAB — SARS-COV-2 RNA RESP QL NAA+PROBE: NEGATIVE

## 2022-08-24 LAB
ANNOTATION COMMENT IMP: NORMAL
HLA-DQ2 QL: NEGATIVE
HLA-DQ8 QL: POSITIVE
REF LAB TEST METHOD: NORMAL

## 2022-08-29 DIAGNOSIS — K90.0 CELIAC DISEASE: Primary | ICD-10-CM

## 2023-01-11 ENCOUNTER — TELEPHONE (OUTPATIENT)
Dept: FAMILY MEDICINE CLINIC | Facility: CLINIC | Age: 53
End: 2023-01-11

## 2023-01-23 ENCOUNTER — RA CDI HCC (OUTPATIENT)
Dept: OTHER | Facility: HOSPITAL | Age: 53
End: 2023-01-23

## 2023-01-23 NOTE — PROGRESS NOTES
[facility]  coding opportunities       Chart reviewed, no opportunity found: CHART REVIEWED, NO OPPORTUNITY FOUND        Patients Insurance        Commercial Insurance: [employer]

## 2023-02-15 ENCOUNTER — OFFICE VISIT (OUTPATIENT)
Dept: FAMILY MEDICINE CLINIC | Facility: CLINIC | Age: 53
End: 2023-02-15

## 2023-02-15 VITALS
OXYGEN SATURATION: 97 % | DIASTOLIC BLOOD PRESSURE: 70 MMHG | TEMPERATURE: 97.4 F | HEART RATE: 101 BPM | BODY MASS INDEX: 28.98 KG/M2 | RESPIRATION RATE: 16 BRPM | WEIGHT: 191.2 LBS | SYSTOLIC BLOOD PRESSURE: 108 MMHG | HEIGHT: 68 IN

## 2023-02-15 DIAGNOSIS — R73.09 ELEVATED GLUCOSE: ICD-10-CM

## 2023-02-15 DIAGNOSIS — Z12.5 SCREENING FOR MALIGNANT NEOPLASM OF PROSTATE: ICD-10-CM

## 2023-02-15 DIAGNOSIS — R42 VERTIGO: ICD-10-CM

## 2023-02-15 DIAGNOSIS — K21.9 GASTROESOPHAGEAL REFLUX DISEASE, UNSPECIFIED WHETHER ESOPHAGITIS PRESENT: ICD-10-CM

## 2023-02-15 DIAGNOSIS — K50.90 CROHN'S DISEASE WITHOUT COMPLICATION, UNSPECIFIED GASTROINTESTINAL TRACT LOCATION (HCC): ICD-10-CM

## 2023-02-15 DIAGNOSIS — Z00.00 ANNUAL PHYSICAL EXAM: Primary | ICD-10-CM

## 2023-02-15 DIAGNOSIS — M25.512 ACUTE PAIN OF LEFT SHOULDER: ICD-10-CM

## 2023-02-15 RX ORDER — MECLIZINE HYDROCHLORIDE 25 MG/1
25 TABLET ORAL 3 TIMES DAILY PRN
Qty: 30 TABLET | Refills: 0 | Status: SHIPPED | OUTPATIENT
Start: 2023-02-15

## 2023-02-15 NOTE — PROGRESS NOTES
[facility]    NAME: Rabia Current  AGE: 45 y o   SEX: male  : 1970     DATE: 2023     Assessment and Plan:     Problem List Items Addressed This Visit        Digestive    Gastroesophageal reflux disease    Relevant Orders    Vitamin D 25 hydroxy    Comprehensive metabolic panel    CBC and differential    TSH, 3rd generation with Free T4 reflex    Lipid Panel with Direct LDL reflex    HEMOGLOBIN A1C W/ EAG ESTIMATION    PSA, Total Screen       Other    Vertigo    Relevant Medications    meclizine (ANTIVERT) 25 mg tablet    Other Relevant Orders    Vitamin D 25 hydroxy    Comprehensive metabolic panel    CBC and differential    TSH, 3rd generation with Free T4 reflex    Lipid Panel with Direct LDL reflex    HEMOGLOBIN A1C W/ EAG ESTIMATION    PSA, Total Screen    Elevated glucose    Relevant Orders    Vitamin D 25 hydroxy    Comprehensive metabolic panel    CBC and differential    TSH, 3rd generation with Free T4 reflex    Lipid Panel with Direct LDL reflex    HEMOGLOBIN A1C W/ EAG ESTIMATION    PSA, Total Screen    Crohn's disease without complication (HCC)    Relevant Orders    Vitamin D 25 hydroxy    Comprehensive metabolic panel    CBC and differential    TSH, 3rd generation with Free T4 reflex    Lipid Panel with Direct LDL reflex    HEMOGLOBIN A1C W/ EAG ESTIMATION    PSA, Total Screen   Other Visit Diagnoses     Annual physical exam    -  Primary    Relevant Orders    Vitamin D 25 hydroxy    Comprehensive metabolic panel    CBC and differential    TSH, 3rd generation with Free T4 reflex    Lipid Panel with Direct LDL reflex    HEMOGLOBIN A1C W/ EAG ESTIMATION    PSA, Total Screen    Screening for malignant neoplasm of prostate        Relevant Orders    Vitamin D 25 hydroxy    Comprehensive metabolic panel    CBC and differential    TSH, 3rd generation with Free T4 reflex    Lipid Panel with Direct LDL reflex HEMOGLOBIN A1C W/ EAG ESTIMATION    PSA, Total Screen    Acute pain of left shoulder        Relevant Orders    XR shoulder 2+ vw left          Immunizations and preventive care screenings were discussed with patient today  Appropriate education was printed on patient's after visit summary  Discussed risks and benefits of prostate cancer screening  We discussed the controversial history of PSA screening for prostate cancer in the [place] as well as the risk of over detection and over treatment of prostate cancer by way of PSA screening  The patient understands that PSA blood testing is an imperfect way to screen for prostate cancer and that elevated PSA levels in the blood may also be caused by infection, inflammation, prostatic trauma or manipulation, urological procedures, or by benign prostatic enlargement  The role of the digital rectal examination in prostate cancer screening was also discussed and I discussed with him that there is large interobserver variability in the findings of digital rectal examination  Counseling:  Alcohol/drug use: discussed moderation in alcohol intake, the recommendations for healthy alcohol use, and avoidance of illicit drug use  Dental Health: discussed importance of regular tooth brushing, flossing, and dental visits  Injury prevention: discussed safety/seat belts, safety helmets, smoke detectors, carbon dioxide detectors, and smoking near bedding or upholstery  Sexual health: discussed sexually transmitted diseases, partner selection, use of condoms, avoidance of unintended pregnancy, and contraceptive alternatives  · Exercise: the importance of regular exercise/physical activity was discussed  Recommend exercise 3-5 times per week for at least 30 minutes  BMI Counseling: Body mass index is 29 07 kg/m²   The BMI is above normal  Nutrition recommendations include decreasing portion sizes, encouraging healthy choices of fruits and vegetables, decreasing fast food intake, consuming healthier snacks, limiting drinks that contain sugar, moderation in carbohydrate intake, increasing intake of lean protein, reducing intake of saturated and trans fat and reducing intake of cholesterol  Exercise recommendations include moderate physical activity 150 minutes/week, exercising 3-5 times per week and strength training exercises  No pharmacotherapy was ordered  Rationale for BMI follow-up plan is due to patient being overweight or obese  Depression Screening and Follow-up Plan: Patient was screened for depression during today's encounter  They screened negative with a PHQ-2 score of 0  Return in about 6 months (around 8/15/2023) for Recheck  Chief Complaint:     Chief Complaint   Patient presents with   • Physical Exam     virtigo      History of Present Illness:     Adult Annual Physical   Patient here for a comprehensive physical exam  The patient reports problems - two weeks ago had GI but for one day, had vertigo few days ago, doing eply and taking meclizine  Having left shoulder pain, months, hurts worse with laying on it  May have lifted something heavy, banged into wall  On and off pain    Diet and Physical Activity  · Diet/Nutrition: well balanced diet  · Exercise: 1-2 times a week on average  Depression Screening  PHQ-2/9 Depression Screening    Little interest or pleasure in doing things: 0 - not at all  Feeling down, depressed, or hopeless: 0 - not at all  PHQ-2 Score: 0  PHQ-2 Interpretation: Negative depression screen       General Health  · Sleep: sleeps well  · Hearing: normal - bilateral   · Vision: wears glasses  · Dental: regular dental visits   Health  · Symptoms include: none     Review of Systems:     Review of Systems   Constitutional: Negative for fatigue and fever  HENT: Negative for congestion, postnasal drip and rhinorrhea  Eyes: Negative for photophobia and visual disturbance     Respiratory: Negative for cough, shortness of breath and wheezing  Cardiovascular: Negative for chest pain and palpitations  Gastrointestinal: Negative for constipation, diarrhea, nausea and vomiting  Genitourinary: Negative for dysuria and frequency  Musculoskeletal: Positive for arthralgias and myalgias  Skin: Negative for rash  Neurological: Positive for dizziness  Hematological: Negative for adenopathy  Psychiatric/Behavioral: Negative for dysphoric mood and sleep disturbance  The patient is not nervous/anxious  Past Medical History:     Past Medical History:   Diagnosis Date   • Colitis    • Colon polyp    • Crohn disease [facility])       Past Surgical History:     Past Surgical History:   Procedure Laterality Date   • BACK SURGERY      lumbar discectomy    • COLON SURGERY  2002 11" inrestines removed     • COLONOSCOPY     • SKULL FRACTURE ELEVATION        Family History:     Family History   Problem Relation Age of Onset   • Coronary artery disease Mother    • Diabetes Mother    • Coronary artery disease Father       Social History:     Social History     Socioeconomic History   • Marital status: Single     Spouse name: None   • Number of children: None   • Years of education: None   • Highest education level: None   Occupational History   • None   Tobacco Use   • Smoking status: Never   • Smokeless tobacco: Never   Vaping Use   • Vaping Use: Never used   Substance and Sexual Activity   • Alcohol use: Yes     Comment: socially   • Drug use: Yes     Types: Marijuana   • Sexual activity: Yes     Partners: Female   Other Topics Concern   • None   Social History Narrative   • None     Social Determinants of Health     Financial Resource Strain: Not on file   Food Insecurity: Not on file   Transportation Needs: Not on file   Physical Activity: Not on file   Stress: Not on file   Social Connections: Not on file   Intimate Partner Violence: Not on file   Housing Stability: Not on file      Current Medications:     Current Outpatient Medications   Medication Sig Dispense Refill   • meclizine (ANTIVERT) 25 mg tablet Take 1 tablet (25 mg total) by mouth 3 (three) times a day as needed for dizziness 30 tablet 0   • Ascorbic Acid (vitamin C) 100 MG tablet Take 100 mg by mouth daily     • cholecalciferol (VITAMIN D3) 1,000 units tablet Take 1 tablet (1,000 Units total) by mouth daily 30 tablet 3   • cholecalciferol (VITAMIN D3) 400 units tablet Take 400 Units by mouth daily     • omeprazole (PriLOSEC) 40 MG capsule Take 1 capsule (40 mg total) by mouth daily 30 capsule 3   • zinc gluconate 50 mg tablet Take 50 mg by mouth daily       No current facility-administered medications for this visit  Allergies:     No Known Allergies   Physical Exam:     /70   Pulse 101   Temp (!) 97 4 °F (36 3 °C)   Resp 16   Ht 5' 8" (1 727 m)   Wt 86 7 kg (191 lb 3 2 oz)   SpO2 97%   BMI 29 07 kg/m²     Physical Exam  Vitals and nursing note reviewed  Constitutional:       Appearance: Normal appearance  HENT:      Head: Normocephalic and atraumatic  Right Ear: Tympanic membrane, ear canal and external ear normal       Left Ear: Tympanic membrane, ear canal and external ear normal       Nose: Nose normal       Mouth/Throat:      Mouth: Mucous membranes are moist    Eyes:      Extraocular Movements: Extraocular movements intact  Conjunctiva/sclera: Conjunctivae normal    Neck:      Vascular: No carotid bruit  Cardiovascular:      Rate and Rhythm: Normal rate and regular rhythm  Pulses: Normal pulses  Heart sounds: Normal heart sounds  Pulmonary:      Effort: Pulmonary effort is normal       Breath sounds: Normal breath sounds  Abdominal:      General: Bowel sounds are normal       Palpations: Abdomen is soft  Musculoskeletal:      Right shoulder: Tenderness present  No bony tenderness or crepitus  Decreased range of motion  Normal strength  Arms:       Cervical back: Normal range of motion and neck supple  Skin:     General: Skin is warm and dry  Capillary Refill: Capillary refill takes less than 2 seconds  Neurological:      General: No focal deficit present  Mental Status: He is alert and oriented to person, place, and time  Psychiatric:         Mood and Affect: Mood normal          Behavior: Behavior normal          Thought Content:  Thought content normal          Judgment: Judgment normal           Garcia Arthur, [address] [place] [address], [address]

## 2023-02-15 NOTE — PATIENT INSTRUCTIONS

## 2023-03-06 ENCOUNTER — APPOINTMENT (OUTPATIENT)
Dept: LAB | Facility: CLINIC | Age: 53
End: 2023-03-06

## 2023-03-06 ENCOUNTER — HOSPITAL ENCOUNTER (OUTPATIENT)
Dept: RADIOLOGY | Facility: HOSPITAL | Age: 53
Discharge: HOME | End: 2023-03-06

## 2023-03-06 DIAGNOSIS — K50.90 CROHN'S DISEASE WITHOUT COMPLICATION, UNSPECIFIED GASTROINTESTINAL TRACT LOCATION (HCC): ICD-10-CM

## 2023-03-06 DIAGNOSIS — R42 VERTIGO: ICD-10-CM

## 2023-03-06 DIAGNOSIS — R73.09 ELEVATED GLUCOSE: ICD-10-CM

## 2023-03-06 DIAGNOSIS — M25.512 ACUTE PAIN OF LEFT SHOULDER: ICD-10-CM

## 2023-03-06 DIAGNOSIS — Z12.5 SCREENING FOR MALIGNANT NEOPLASM OF PROSTATE: ICD-10-CM

## 2023-03-06 DIAGNOSIS — K21.9 GASTROESOPHAGEAL REFLUX DISEASE, UNSPECIFIED WHETHER ESOPHAGITIS PRESENT: ICD-10-CM

## 2023-03-06 DIAGNOSIS — Z00.00 ANNUAL PHYSICAL EXAM: ICD-10-CM

## 2023-03-06 LAB
BASOPHILS # BLD AUTO: 0.01 THOUSANDS/ÂΜL (ref 0–0.1)
BASOPHILS NFR BLD AUTO: 0 % (ref 0–1)
CHOLEST SERPL-MCNC: 172 MG/DL
EOSINOPHIL # BLD AUTO: 0.07 THOUSAND/ÂΜL (ref 0–0.61)
EOSINOPHIL NFR BLD AUTO: 2 % (ref 0–6)
ERYTHROCYTE [DISTWIDTH] IN BLOOD BY AUTOMATED COUNT: 13.2 % (ref 11.6–15.1)
HCT VFR BLD AUTO: 41.7 % (ref 36.5–49.3)
HDLC SERPL-MCNC: 52 MG/DL
HGB BLD-MCNC: 14.2 G/DL (ref 12–17)
IMM GRANULOCYTES # BLD AUTO: 0.02 THOUSAND/UL (ref 0–0.2)
IMM GRANULOCYTES NFR BLD AUTO: 0 % (ref 0–2)
LDLC SERPL CALC-MCNC: 102 MG/DL (ref 0–100)
LYMPHOCYTES # BLD AUTO: 1.55 THOUSANDS/ÂΜL (ref 0.6–4.47)
LYMPHOCYTES NFR BLD AUTO: 32 % (ref 14–44)
MCH RBC QN AUTO: 30.1 PG (ref 26.8–34.3)
MCHC RBC AUTO-ENTMCNC: 34.1 G/DL (ref 31.4–37.4)
MCV RBC AUTO: 88 FL (ref 82–98)
MONOCYTES # BLD AUTO: 0.42 THOUSAND/ÂΜL (ref 0.17–1.22)
MONOCYTES NFR BLD AUTO: 9 % (ref 4–12)
NEUTROPHILS # BLD AUTO: 2.72 THOUSANDS/ÂΜL (ref 1.85–7.62)
NEUTS SEG NFR BLD AUTO: 57 % (ref 43–75)
NRBC BLD AUTO-RTO: 0 /100 WBCS
PLATELET # BLD AUTO: 246 THOUSANDS/UL (ref 149–390)
PMV BLD AUTO: 11.5 FL (ref 8.9–12.7)
PSA SERPL-MCNC: 0.8 NG/ML (ref 0–4)
RBC # BLD AUTO: 4.72 MILLION/UL (ref 3.88–5.62)
TRIGL SERPL-MCNC: 90 MG/DL
TSH SERPL DL<=0.05 MIU/L-ACNC: 6.69 UIU/ML (ref 0.45–4.5)
WBC # BLD AUTO: 4.79 THOUSAND/UL (ref 4.31–10.16)

## 2023-03-07 LAB
EST. AVERAGE GLUCOSE BLD GHB EST-MCNC: 194 MG/DL
HBA1C MFR BLD: 8.4 %
T4 FREE SERPL-MCNC: 0.72 NG/DL (ref 0.76–1.46)

## 2023-03-11 DIAGNOSIS — M25.519 CHRONIC SHOULDER PAIN, UNSPECIFIED LATERALITY: Primary | ICD-10-CM

## 2023-03-11 DIAGNOSIS — G89.29 CHRONIC SHOULDER PAIN, UNSPECIFIED LATERALITY: Primary | ICD-10-CM

## 2023-03-11 LAB — CALPROTECTIN STL-MCNT: 22 UG/G (ref 0–120)

## 2023-03-13 DIAGNOSIS — R73.09 ELEVATED GLUCOSE: Primary | ICD-10-CM

## 2023-03-13 DIAGNOSIS — R79.89 ELEVATED TSH: ICD-10-CM

## 2023-03-15 ENCOUNTER — APPOINTMENT (OUTPATIENT)
Dept: LAB | Facility: CLINIC | Age: 53
End: 2023-03-15

## 2023-03-15 ENCOUNTER — OFFICE VISIT (OUTPATIENT)
Dept: OBGYN CLINIC | Facility: CLINIC | Age: 53
End: 2023-03-15

## 2023-03-15 VITALS
HEART RATE: 74 BPM | BODY MASS INDEX: 28.95 KG/M2 | WEIGHT: 191 LBS | HEIGHT: 68 IN | SYSTOLIC BLOOD PRESSURE: 122 MMHG | DIASTOLIC BLOOD PRESSURE: 86 MMHG

## 2023-03-15 DIAGNOSIS — R73.09 ELEVATED GLUCOSE: ICD-10-CM

## 2023-03-15 DIAGNOSIS — M25.512 CHRONIC LEFT SHOULDER PAIN: ICD-10-CM

## 2023-03-15 DIAGNOSIS — G89.29 CHRONIC LEFT SHOULDER PAIN: ICD-10-CM

## 2023-03-15 DIAGNOSIS — R79.89 ELEVATED TSH: ICD-10-CM

## 2023-03-15 DIAGNOSIS — M75.82 TENDINITIS OF LEFT ROTATOR CUFF: Primary | ICD-10-CM

## 2023-03-15 LAB
EST. AVERAGE GLUCOSE BLD GHB EST-MCNC: 203 MG/DL
HBA1C MFR BLD: 8.7 %
TSH SERPL DL<=0.05 MIU/L-ACNC: 2.73 UIU/ML (ref 0.45–4.5)

## 2023-03-15 RX ORDER — TRIAMCINOLONE ACETONIDE 40 MG/ML
40 INJECTION, SUSPENSION INTRA-ARTICULAR; INTRAMUSCULAR
Status: COMPLETED | OUTPATIENT
Start: 2023-03-15 | End: 2023-03-15

## 2023-03-15 RX ORDER — LIDOCAINE HYDROCHLORIDE 5 MG/ML
4 INJECTION, SOLUTION INFILTRATION; PERINEURAL
Status: COMPLETED | OUTPATIENT
Start: 2023-03-15 | End: 2023-03-15

## 2023-03-15 RX ADMIN — TRIAMCINOLONE ACETONIDE 40 MG: 40 INJECTION, SUSPENSION INTRA-ARTICULAR; INTRAMUSCULAR at 13:00

## 2023-03-15 RX ADMIN — LIDOCAINE HYDROCHLORIDE 4 ML: 5 INJECTION, SOLUTION INFILTRATION; PERINEURAL at 13:00

## 2023-03-15 NOTE — PROGRESS NOTES
[facility] [id number]  [address]  [id number]  1970    ORTHOPAEDIC SURGERY OUTPATIENT NOTE  3/15/2023      HISTORY:  45 y o  RHD male presenting for evaluation of left shoulder pain  Patient was referred for orthopedic consultation by their PCP BRI Singleton  Patient has had pain in his left shoulder for the past 6 months with no associated injury  He localizes the pain to the posterior aspect of his shoulder, also with intermittent left sided neck pain  Denies any numbness, tingling or pain radiating down the arm  He states his pain is worst when laying on the left side and reaching to grab objects  He has tried [employer] and CBD oil for pain which does help  He is an  for work  Past Medical History:   Diagnosis Date   • Colitis    • Colon polyp    • Crohn disease [facility])        Past Surgical History:   Procedure Laterality Date   • BACK SURGERY      lumbar discectomy    • COLON SURGERY  2002 11" inrestines removed     • COLONOSCOPY     • SKULL FRACTURE ELEVATION         Social History     Socioeconomic History   • Marital status: Single     Spouse name: Not on file   • Number of children: Not on file   • Years of education: Not on file   • Highest education level: Not on file   Occupational History   • Not on file   Tobacco Use   • Smoking status: Never   • Smokeless tobacco: Never   Vaping Use   • Vaping Use: Never used   Substance and Sexual Activity   • Alcohol use: Yes     Comment: socially   • Drug use: Yes     Types: Marijuana   • Sexual activity: Yes     Partners: Female   Other Topics Concern   • Not on file   Social History Narrative   • Not on file     Social Determinants of Health     Financial Resource Strain: Not on file   Food Insecurity: Not on file   Transportation Needs: Not on file   Physical Activity: Not on file   Stress: Not on file   Social Connections: Not on file   Intimate Partner Violence: Not on file   Housing Stability: Not on file       Family History   Problem Relation Age of Onset   • Coronary artery disease Mother    • Diabetes Mother    • Coronary artery disease Father         Patient's Medications   New Prescriptions    No medications on file   Previous Medications    ASCORBIC ACID (VITAMIN C) 100 MG TABLET    Take 100 mg by mouth daily    CHOLECALCIFEROL (VITAMIN D3) 1,000 UNITS TABLET    Take 1 tablet (1,000 Units total) by mouth daily    CHOLECALCIFEROL (VITAMIN D3) 400 UNITS TABLET    Take 400 Units by mouth daily    MECLIZINE (ANTIVERT) 25 MG TABLET    Take 1 tablet (25 mg total) by mouth 3 (three) times a day as needed for dizziness    OMEPRAZOLE (PRILOSEC) 40 MG CAPSULE    Take 1 capsule (40 mg total) by mouth daily    ZINC GLUCONATE 50 MG TABLET    Take 50 mg by mouth daily   Modified Medications    No medications on file   Discontinued Medications    No medications on file       No Known Allergies     There were no vitals taken for this visit  REVIEW OF SYSTEMS:  Constitutional: Negative  HEENT: Negative  Respiratory: Negative  Skin: Negative  Neurological: Negative  Psychiatric/Behavioral: Negative  Musculoskeletal: Negative except for that mentioned in the HPI  PHYSICAL EXAM:     There were no vitals taken for this visit    Gen: No acute distress, resting comfortably in bed  HEENT: Eyes clear, moist mucus membranes, hearing intact  Respiratory: No audible wheezing or stridor  Cardiovascular: Well Perfused peripherally, 2+ distal pulse  Abdomen: nondistended, no peritoneal signs    LEFT SHOULDER:     NVI axillary/medial/radial/ulnar and sensory intact     Forward flexion:   180 degrees  Abduction:  180 degrees  External rotation at 90 degrees abduction:   90 degrees  Internal rotation at 90 degrees abduction:  90 degrees  External rotation at 0 degrees:   70 degrees  Internal rotation: T7 STRENGTH:  Forward flexion:  5/5  Abduction:  5/5  External rotation:  4+/5  Internal rotation:  5/5       Speed test: Negative  Yergason's: Negative   Tender to palpation ACJ (acromioclavicular joint): Negative   Tender to palpation LHB (long head of biceps): Negative  Collado test: Negative  Morrow test: Negative  Hornblower's: Negative  Lift off: Negative  Belly press: Negative  Bear hug: Negative  External lag sign: Negative  Cross-body adduction: Negative  Sulcus sign: Negative  Santosh's test: Positive  Drop arm test: negative     RIGHT SHOULDER:     NVI axillary/medial/radial/ulnar and sensory intact     Forward flexion:   180 degrees   Abduction:  180 degrees   External rotation at 90 degrees abduction:  90 degrees   Internal rotation at 90 degrees abduction:   90 degrees   External rotation at 0 degrees:  70 degrees   Internal rotation: T7      STRENGTH:  Forward flexion:  5/5   Abduction:  5/5   External rotation:  5/5   Internal rotation:  5/5     Reflexes:  Brachioradialis:  Symmetric bilaterally  Triceps: Symmetric bilaterally  Biceps: Symmetric bilaterally  Patella tendon: Symmetric bilaterally  Achilles tendon: Symmetric bilaterally  Babinski's: Negative  Zara sign: Negative     No scapular winging or dyskinesis     Capillary refill brisk   Full ROM of elbows bilaterally      Skin:  No ecchymosis/abrasion/erythema/warmth     Cervical spine:   Full rotation, side bending, flexion and extension without radiating pain  Spurling's: Negative    IMAGING:  X-rays of the left shoulder performed 3/6/2023 demonstrate no acute osseous abnormalities  Mild degenerative changes in the [facility] and GH joints  Large joint arthrocentesis: L subacromial bursa  Universal Protocol:  Consent: Verbal consent obtained    Risks and benefits: risks, benefits and alternatives were discussed  Consent given by: patient  Time out: Immediately prior to procedure a "time out" was called to verify the correct patient, procedure, equipment, support staff and site/side marked as required  Patient understanding: patient states understanding of the procedure being performed  Site marked: the operative site was marked  Patient identity confirmed: verbally with patient    Supporting Documentation  Indications: pain   Procedure Details  Location: shoulder - L subacromial bursa  Preparation: Patient was prepped and draped in the usual sterile fashion  Needle size: 22 G  Ultrasound guidance: no  Approach: lateral  Medications administered: 4 mL lidocaine 0 5 %; 40 mg triamcinolone acetonide 40 mg/mL    Patient tolerance: patient tolerated the procedure well with no immediate complications  Dressing:  Sterile dressing applied        ASSESSMENT AND PLAN: 45 y o  male left rotator cuff tendinitis  X-rays reviewed with him in the office today and he does have mild osteoarthritis changes however his exam is more consistent with rotator cuff tendinitis  For this would recommend starting with home exercises and steroid injection to alleviate his pain, patient is agreeable to this plan  Risks and benefits of steroid injection discussed in the office today  Patient elected to proceed  Patient was provided with a steroid injection to the left subacromial bursa in the office today  Patient tolerated this procedure well with no immediate complications  Post-injection instructions discussed with the patient  They should rest, apply ice, or take Tylenol/NSAIDs as needed for post-injection soreness  He was given the home exercises and Theraband in the office today  I will see him back in 6 weeks if no improvement with the injection      Scribe Attestation    I,:  Lisa Fleming am acting as a scribe while in the presence of the attending physician :       I,:  Vielka Ozuna personally performed the services described in this documentation    as scribed in my presence :

## 2023-03-24 ENCOUNTER — OFFICE VISIT (OUTPATIENT)
Dept: FAMILY MEDICINE CLINIC | Facility: CLINIC | Age: 53
End: 2023-03-24

## 2023-03-24 VITALS
SYSTOLIC BLOOD PRESSURE: 122 MMHG | RESPIRATION RATE: 16 BRPM | TEMPERATURE: 97 F | BODY MASS INDEX: 27.83 KG/M2 | WEIGHT: 183 LBS | OXYGEN SATURATION: 97 % | HEART RATE: 64 BPM | DIASTOLIC BLOOD PRESSURE: 86 MMHG

## 2023-03-24 DIAGNOSIS — K50.90 CROHN'S DISEASE WITHOUT COMPLICATION, UNSPECIFIED GASTROINTESTINAL TRACT LOCATION (HCC): ICD-10-CM

## 2023-03-24 DIAGNOSIS — E11.65 TYPE 2 DIABETES MELLITUS WITH HYPERGLYCEMIA, WITHOUT LONG-TERM CURRENT USE OF INSULIN (HCC): Primary | ICD-10-CM

## 2023-03-24 RX ORDER — BLOOD SUGAR DIAGNOSTIC
STRIP MISCELLANEOUS
Qty: 100 EACH | Refills: 3 | Status: SHIPPED | OUTPATIENT
Start: 2023-03-24

## 2023-03-24 RX ORDER — LANCETS 33 GAUGE
EACH MISCELLANEOUS
Qty: 100 EACH | Refills: 3 | Status: SHIPPED | OUTPATIENT
Start: 2023-03-24

## 2023-03-24 RX ORDER — BLOOD-GLUCOSE METER
KIT MISCELLANEOUS
Qty: 1 KIT | Refills: 0 | Status: SHIPPED | OUTPATIENT
Start: 2023-03-24

## 2023-03-26 PROBLEM — E11.65 TYPE 2 DIABETES MELLITUS WITH HYPERGLYCEMIA, WITHOUT LONG-TERM CURRENT USE OF INSULIN (HCC): Status: ACTIVE | Noted: 2023-03-26

## 2023-03-26 NOTE — ASSESSMENT & PLAN NOTE
Lab Results   Component Value Date    HGBA1C 8 7 (H) 03/15/2023   start meformin, take with food  To see diabetes educator  Glucometer sent

## 2023-03-26 NOTE — PROGRESS NOTES
FAMILY PRACTICE OFFICE VISIT       NAME: Dwayne Massey  AGE: 45 y o  SEX: male       : 1970        MRN: [id number]    DATE: 3/26/2023  TIME: 5:33 PM    Assessment and Plan   1  Type 2 diabetes mellitus with hyperglycemia, without long-term current use of insulin [facility])  Assessment & Plan:    Lab Results   Component Value Date    HGBA1C 8 7 (H) 03/15/2023   start meformin, take with food  To see diabetes educator  Glucometer sent      Orders:  -     Blood Glucose Monitoring Suppl (OneTouch Verio Reflect) w/Device KIT; Check blood sugars once daily  Please substitute with appropriate alternative as covered by patient's insurance  Dx: E11 65  -     glucose blood (OneTouch Verio) test strip; Check blood sugars once daily  Please substitute with appropriate alternative as covered by patient's insurance  Dx: E11 65  -     OneTouch Delica Lancets 57X MISC; Check blood sugars once daily  Please substitute with appropriate alternative as covered by patient's insurance  Dx: E11 65  -     Blood Glucose Monitoring Suppl (OneTouch Verio Reflect) w/Device KIT; Check blood sugars once daily  Please substitute with appropriate alternative as covered by patient's insurance  Dx: E11 65  -     glucose blood (OneTouch Verio) test strip; Check blood sugars once daily  Please substitute with appropriate alternative as covered by patient's insurance  Dx: E11 65  -     OneTouch Delica Lancets 90G MISC; Check blood sugars once daily  Please substitute with appropriate alternative as covered by patient's insurance  Dx: E11 65  -     metFORMIN (GLUCOPHAGE) 500 mg tablet; Take 1 tablet (500 mg total) by mouth 2 (two) times a day with meals  -     HEMOGLOBIN A1C W/ EAG ESTIMATION; Future; Expected date: 2023  -     Comprehensive metabolic panel; Future; Expected date: 2023    2   Crohn's disease without complication, unspecified gastrointestinal tract location [facility])  Assessment & Plan:  Cont f/u with GI "            Chief Complaint     Chief Complaint   Patient presents with   • Follow-up   • Diabetes     New onset         History of Present Illness   Maria Del Carmen Lucas is a 45y o -year-old male who is here to discuss new diagosis of diabetes  Discussed new diagosis  He was having increase thirst and urination  Family hx of diabetes  He will make appt with diabetes educator      Review of Systems   Review of Systems   Constitutional: Negative for fatigue and fever  HENT: Negative for congestion, postnasal drip and rhinorrhea  Eyes: Negative for photophobia and visual disturbance  Respiratory: Negative for cough, shortness of breath and wheezing  Cardiovascular: Negative for chest pain and palpitations  Gastrointestinal: Negative for constipation, diarrhea, nausea and vomiting  Endocrine: Positive for polydipsia, polyphagia and polyuria  Genitourinary: Positive for frequency  Negative for dysuria  Musculoskeletal: Negative for arthralgias and myalgias  Skin: Negative for rash  Neurological: Negative for dizziness, light-headedness and headaches  Hematological: Negative for adenopathy  Psychiatric/Behavioral: Negative for dysphoric mood and sleep disturbance  The patient is not nervous/anxious  Active Problem List     Patient Active Problem List   Diagnosis   • Crohn's disease without complication ([facility] )   • Gastroesophageal reflux disease   • Vertigo   • Elevated serum creatinine   • Elevated glucose   • Type 2 diabetes mellitus with hyperglycemia, without long-term current use of insulin [facility])         Past Medical History:  Past Medical History:   Diagnosis Date   • Colitis    • Colon polyp    • Crohn disease ([facility] )        Past Surgical History:  Past Surgical History:   Procedure Laterality Date   • BACK SURGERY      lumbar discectomy    • COLON SURGERY  2002    11\" inrestines removed     • COLONOSCOPY     • SKULL FRACTURE ELEVATION         Family History:  Family History   Problem " Relation Age of Onset   • Coronary artery disease Mother    • Diabetes Mother    • Coronary artery disease Father        Social History:  Social History     Socioeconomic History   • Marital status: Single     Spouse name: Not on file   • Number of children: Not on file   • Years of education: Not on file   • Highest education level: Not on file   Occupational History   • Not on file   Tobacco Use   • Smoking status: Never   • Smokeless tobacco: Never   Vaping Use   • Vaping Use: Never used   Substance and Sexual Activity   • Alcohol use: Yes     Comment: socially   • Drug use: Yes     Types: Marijuana   • Sexual activity: Yes     Partners: Female   Other Topics Concern   • Not on file   Social History Narrative   • Not on file     Social Determinants of Health     Financial Resource Strain: Not on file   Food Insecurity: Not on file   Transportation Needs: Not on file   Physical Activity: Not on file   Stress: Not on file   Social Connections: Not on file   Intimate Partner Violence: Not on file   Housing Stability: Not on file       Objective     Vitals:    03/24/23 1505   BP: 122/86   Pulse: 64   Resp: 16   Temp: (!) 97 °F (36 1 °C)   SpO2: 97%     Wt Readings from Last 3 Encounters:   03/24/23 83 kg (183 lb)   03/15/23 86 6 kg (191 lb)   02/15/23 86 7 kg (191 lb 3 2 oz)       Physical Exam  Vitals and nursing note reviewed  Constitutional:       Appearance: Normal appearance  HENT:      Head: Normocephalic and atraumatic  Eyes:      Conjunctiva/sclera: Conjunctivae normal    Cardiovascular:      Rate and Rhythm: Normal rate and regular rhythm  Pulses: no weak pulses          Dorsalis pedis pulses are 2+ on the right side and 2+ on the left side  Heart sounds: Normal heart sounds  Pulmonary:      Effort: Pulmonary effort is normal       Breath sounds: Normal breath sounds  Musculoskeletal:         General: Normal range of motion  Cervical back: Normal range of motion     Feet:      Right foot:      Skin integrity: No ulcer, skin breakdown, erythema, warmth, callus or dry skin  Left foot:      Skin integrity: No ulcer, skin breakdown, erythema, warmth, callus or dry skin  Skin:     General: Skin is warm and dry  Capillary Refill: Capillary refill takes less than 2 seconds  Neurological:      Mental Status: He is alert and oriented to person, place, and time  Psychiatric:         Mood and Affect: Mood normal          Behavior: Behavior normal          Thought Content: Thought content normal          Judgment: Judgment normal          Pertinent Laboratory/Diagnostic Studies:  Lab Results   Component Value Date    BUN 14 08/09/2022    CREATININE 1 09 08/09/2022    CALCIUM 9 2 08/09/2022    K 4 3 08/09/2022    CO2 28 08/09/2022     (H) 08/09/2022     Lab Results   Component Value Date    ALT 14 08/09/2022    AST 16 08/09/2022    ALKPHOS 66 08/09/2022       Lab Results   Component Value Date    WBC 4 79 03/06/2023    HGB 14 2 03/06/2023    HCT 41 7 03/06/2023    MCV 88 03/06/2023     03/06/2023       No results found for: TSH    No results found for: CHOL  Lab Results   Component Value Date    TRIG 90 03/06/2023     Lab Results   Component Value Date    HDL 52 03/06/2023     Lab Results   Component Value Date    LDLCALC 102 (H) 03/06/2023     Lab Results   Component Value Date    HGBA1C 8 7 (H) 03/15/2023       Results for orders placed or performed in visit on 03/15/23   TSH, 3rd generation with Free T4 reflex   Result Value Ref Range    TSH 3RD GENERATON 2 734 0 450 - 4 500 uIU/mL   HEMOGLOBIN A1C W/ EAG ESTIMATION   Result Value Ref Range    Hemoglobin A1C 8 7 (H) Normal 3 8-5 6%; PreDiabetic 5 7-6 4%;  Diabetic >=6 5%; Glycemic control for adults with diabetes <7 0% %     mg/dl       Orders Placed This Encounter   Procedures   • HEMOGLOBIN A1C W/ EAG ESTIMATION   • Comprehensive metabolic panel       ALLERGIES:  No Known Allergies    Current Medications     Current Outpatient Medications   Medication Sig Dispense Refill   • Ascorbic Acid (vitamin C) 100 MG tablet Take 100 mg by mouth daily     • Blood Glucose Monitoring Suppl (OneTouch Verio Reflect) w/Device KIT Check blood sugars once daily  Please substitute with appropriate alternative as covered by patient's insurance  Dx: E11 65 1 kit 0   • Blood Glucose Monitoring Suppl (OneTouch Verio Reflect) w/Device KIT Check blood sugars once daily  Please substitute with appropriate alternative as covered by patient's insurance  Dx: E11 65 1 kit 0   • cholecalciferol (VITAMIN D3) 1,000 units tablet Take 1 tablet (1,000 Units total) by mouth daily 30 tablet 3   • cholecalciferol (VITAMIN D3) 400 units tablet Take 400 Units by mouth daily     • glucose blood (OneTouch Verio) test strip Check blood sugars once daily  Please substitute with appropriate alternative as covered by patient's insurance  Dx: E11 65 100 each 3   • glucose blood (OneTouch Verio) test strip Check blood sugars once daily  Please substitute with appropriate alternative as covered by patient's insurance  Dx: E11 65 100 each 3   • meclizine (ANTIVERT) 25 mg tablet Take 1 tablet (25 mg total) by mouth 3 (three) times a day as needed for dizziness 30 tablet 0   • metFORMIN (GLUCOPHAGE) 500 mg tablet Take 1 tablet (500 mg total) by mouth 2 (two) times a day with meals 60 tablet 5   • omeprazole (PriLOSEC) 40 MG capsule Take 1 capsule (40 mg total) by mouth daily 30 capsule 3   • OneTouch Delica Lancets 46J MISC Check blood sugars once daily  Please substitute with appropriate alternative as covered by patient's insurance  Dx: E11 65 100 each 3   • OneTouch Delica Lancets 94X MISC Check blood sugars once daily  Please substitute with appropriate alternative as covered by patient's insurance  Dx: E11 65 100 each 3   • zinc gluconate 50 mg tablet Take 50 mg by mouth daily       No current facility-administered medications for this visit           [facility] Health Maintenance   Topic Date Due   • Kidney Health Evaluation: GFR  Never done   • Kidney Health Evaluation: Microalbumin/Creatinine Ratio  Never done   • Diabetic Foot Exam  Never done   • DM Eye Exam  Never done   • COVID-19 Vaccine (3 - Booster for Leidy series) 03/05/2022   • Influenza Vaccine (1) 06/30/2023 (Originally 9/1/2022)   • Pneumococcal Vaccine: Pediatrics (0 to 5 Years) and At-Risk Patients (6 to 59 Years) (1 - PCV) 03/26/2024 (Originally 11/8/1976)   • HEMOGLOBIN A1C  09/15/2023   • Depression Screening  02/15/2024   • Annual Physical  02/15/2024   • BMI: Followup Plan  02/16/2024   • BMI: Adult  03/24/2024   • Colorectal Cancer Screening  04/03/2024   • DTaP,Tdap,and Td Vaccines (2 - Td or Tdap) 11/17/2031   • HIV Screening  Completed   • Hepatitis C Screening  Completed   • HIB Vaccine  Aged Out   • IPV Vaccine  Aged Out   • Hepatitis A Vaccine  Aged Out   • Meningococcal ACWY Vaccine  Aged Out   • HPV Vaccine  Aged Out     Immunization History   Administered Date(s) Administered   • COVID-19 J&J (Leidy) vaccine 0 5 mL 04/12/2021, 01/08/2022   • Tdap 11/17/2021       Patient's shoes and socks removed  Right Foot/Ankle   Right Foot Inspection  Skin Exam: skin normal and skin intact  No dry skin, no warmth, no callus, no erythema, no maceration, no abnormal color, no pre-ulcer, no ulcer and no callus  Toe Exam: ROM and strength within normal limits  Sensory   Monofilament testing: intact    Vascular  Capillary refills: < 3 seconds  The right DP pulse is 2+  Left Foot/Ankle  Left Foot Inspection  Skin Exam: skin normal and skin intact  No dry skin, no warmth, no erythema, no maceration, normal color, no pre-ulcer, no ulcer and no callus  Toe Exam: ROM and strength within normal limits  Vascular  Capillary refills: elevated  The left DP pulse is 2+       Assign Risk Category  No deformity present  No loss of protective sensation  No weak pulses  Risk: 0      [facility]

## 2023-03-31 ENCOUNTER — CLINICAL SUPPORT (OUTPATIENT)
Dept: NUTRITION | Facility: HOSPITAL | Age: 53
End: 2023-03-31
Attending: INTERNAL MEDICINE

## 2023-03-31 VITALS — WEIGHT: 180.8 LBS | BODY MASS INDEX: 27.49 KG/M2

## 2023-03-31 DIAGNOSIS — K90.0 CELIAC DISEASE: ICD-10-CM

## 2023-03-31 NOTE — PROGRESS NOTES
Nutrition Assessment Form    Patient Name: Terry Obrien    YOB: 1970    Sex: Male     Assessment Date: 3/31/2023  Start Time: 9:00 am Stop Time: 10:00 am Total Minutes: 60     Data:  Present at session: self   Parent/Patient Concerns/reason for visit: 'Recent Dx DM, also has Crohn's disease  Wants to learn how to manage BG with diet'   Medical Dx/Reason for Referral: K90 0 Celiac Disease   Past Medical History:   Diagnosis Date   • Colitis    • Colon polyp    • [facility] disease [facility] )        Current Outpatient Medications   Medication Sig Dispense Refill   • Ascorbic Acid (vitamin C) 100 MG tablet Take 100 mg by mouth daily     • Blood Glucose Monitoring Suppl (OneTouch Verio Reflect) w/Device KIT Check blood sugars once daily  Please substitute with appropriate alternative as covered by patient's insurance  Dx: E11 65 1 kit 0   • Blood Glucose Monitoring Suppl (OneTouch Verio Reflect) w/Device KIT Check blood sugars once daily  Please substitute with appropriate alternative as covered by patient's insurance  Dx: E11 65 1 kit 0   • cholecalciferol (VITAMIN D3) 1,000 units tablet Take 1 tablet (1,000 Units total) by mouth daily 30 tablet 3   • cholecalciferol (VITAMIN D3) 400 units tablet Take 400 Units by mouth daily     • glucose blood (OneTouch Verio) test strip Check blood sugars once daily  Please substitute with appropriate alternative as covered by patient's insurance  Dx: E11 65 100 each 3   • glucose blood (OneTouch Verio) test strip Check blood sugars once daily  Please substitute with appropriate alternative as covered by patient's insurance   Dx: E11 65 100 each 3   • meclizine (ANTIVERT) 25 mg tablet Take 1 tablet (25 mg total) by mouth 3 (three) times a day as needed for dizziness 30 tablet 0   • metFORMIN (GLUCOPHAGE) 500 mg tablet Take 1 tablet (500 mg total) by mouth 2 (two) times a day with meals 60 tablet 5   • omeprazole (PriLOSEC) 40 MG capsule Take 1 capsule (40 mg total) by mouth "daily 30 capsule 3   • OneTouch Delica Lancets 05H MISC Check blood sugars once daily  Please substitute with appropriate alternative as covered by patient's insurance  Dx: E11 65 100 each 3   • OneTouch Delica Lancets 37M MISC Check blood sugars once daily  Please substitute with appropriate alternative as covered by patient's insurance  Dx: E11 65 100 each 3   • zinc gluconate 50 mg tablet Take 50 mg by mouth daily       No current facility-administered medications for this visit  Additional Meds/Supplements: zinc n/a   Special Learning Needs/barriers to learning/any new barriers n/a   Height: HC Readings from Last 5 Encounters:   No data found for [facility]       Weight: Wt Readings from Last 10 Encounters:   03/24/23 83 kg (183 lb)   03/15/23 86 6 kg (191 lb)   02/15/23 86 7 kg (191 lb 3 2 oz)   08/09/22 81 6 kg (179 lb 12 8 oz)   07/12/22 83 2 kg (183 lb 6 4 oz)   04/11/22 82 1 kg (181 lb)   02/02/22 84 2 kg (185 lb 9 6 oz)   01/21/22 83 9 kg (185 lb)   11/17/21 83 3 kg (183 lb 9 6 oz)     Estimated body mass index is 27 83 kg/m² as calculated from the following:    Height as of 3/15/23: 5' 8\" (1 727 m)  Weight as of 3/24/23: 83 kg (183 lb)  Recent Weight Change: [x]Yes     []No  Amount: 10 2 lb decrease X 6 weeks since starting metformin      Energy Needs: [facility] Equation:  2000 kcal   No Known Allergies or intolerances n/a   Social History     Substance and Sexual Activity   Alcohol Use Yes    Comment: socially        1 case beer per week corona   White wine 1 bottle over 1 5-2 weeks (2-3 glasses per week)   Social History     Tobacco Use   Smoking Status Never   Smokeless Tobacco Never       Who shops?  spouse   Who cooks/cooking methods/Eating out/take out habits   spouse  Cooking methods: bake    Take out: _1__ x/wk   Dining out __1__ x/wk    Exercise: Physically active job, moving computer equipment, up and down steps all day at work     Other: ie: Sleep habits/ stress level/ work habits " household-lives with ?/ food security Did not discuss sleep habits during this appointment  Lives with significant other   Prior Nutritional Counseling? []Yes     [x]No  When:      Why:         Diet Hx:  Breakfast: 8:30-9 am bagel or 2 pieces toast, home dejesus potatoes w/ butter, turkey huang Beverages: coffee w/ sweet and low  3-4 016 9 oz bottles water   Lunch: 12:30- 2pm grilled skinless chicken breast, occasional salad or rice, pot roast  Normally chicken fingers and fries          Dinner: 1/2 cup broccoli, 6 oz chicken, and rice, 6 oz salmon  Katy's grilled chicken/ chicken nugget/ Yi frt combo          Snacks: AM -   PM - after work chips, life saver gummies  HS -    Other Notes/ Initial Assessment:  Living with Crohn's disease for ~ 20 years  Had 11 inches of bowel removed, Flare up 3-4 X per year  Well controlled per patient report  Patient did not discuss any concerns for new Dx of Celiac Disease  Stated he is concerned about new Dx of DM  Reported - tested 30 minutes after dinner last night    RD provided Carbohydrate Counting for People with Diabetes from Nutrition Care Manual  Reviewed balancing meals with 4 CHO servings 3 meals a day  Provided examples of standard serving sizes for various CHO sources and each serving as 15 g CHO  Discussed role of lean protein and fiber in digestion and BG control  Reviewed how to read nutrition facts labels for total CHO per serving  Encouraged increasing fruits and non-starchy vegetable intake  Discussed importance of adequate fruits and vegetables, and appropriate serving sizes, including cooking methods, variety of colors daily, and how they help balance diet and food intake  Portions of other foods may increase if adequate fruits and vegetables are not included on a daily basis  Discussed role of protein and fiber in digestion and BG control   Encouraged balancing meals with sources of lean proteins  Disucussed checking 2 hr pp BG vs 30 minutes after meals    Patient was not eager to make changes to his nutrition habits to manage his BG  Expected poor compliance with nutrition recommendations   Updated assessment (Follow up note only):     Nutrition Diagnosis:   Food and nutrition related knowledge deficit  related to Lack of prior exposure to accurate nutrition related information as evidenced by No prior knowledge of need for food and nutrition related recommendations       Any change or new dx since previous visit:     Nutrition Diagnosis:   n/a      Medical Nutrition Therapy Intervention:  []Individualized Meal Plan []Understanding Lab Values   [x]Basic Pathophysiology of Disease []Food/Medication Interactions   []Food Diary []Exercise   []Lifestyle/Behavior Modification Techniques []Medication, Mechanism of Action   [x]Label Reading: CHO/ Na/ Fat/ other_________ [x]Self Blood Glucose Monitoring   []Weight/BMI Goals: gain/lose/maintain []Other -           Comprehension: []Excellent  []Very Good  [x]Good  []Fair   []Poor    Receptivity: []Excellent  []Very Good  []Good  [x]Fair   []Poor    Expected Compliance: []Excellent  []Very Good  []Good  []Fair   [x]Poor        Goals (initial)/ Progress made on previous goals/new goals: 1  Will balance meals with 4 servings per meal (3 meals a day) of CHO and lean proteins by next f/u   2  Will limit fast foods to 1-2 times a month by next f/u    3  No follow-ups on file    Labs:  CMP  Lab Results   Component Value Date    K 4 3 08/09/2022     (H) 08/09/2022    CO2 28 08/09/2022    BUN 14 08/09/2022    CREATININE 1 09 08/09/2022    GLUF 93 08/09/2022    CALCIUM 9 2 08/09/2022    AST 16 08/09/2022    ALT 14 08/09/2022    ALKPHOS 66 08/09/2022    EGFR 77 08/09/2022       BMP  Lab Results   Component Value Date    CALCIUM 9 2 08/09/2022    K 4 3 08/09/2022    CO2 28 08/09/2022     (H) 08/09/2022    BUN 14 08/09/2022    CREATININE 1 09 08/09/2022       Lipids  No results found for: CHOL  Lab Results Component Value Date    HDL 52 03/06/2023    HDL 53 12/12/2021     Lab Results   Component Value Date    LDLCALC 102 (H) 03/06/2023    LDLCALC 98 12/12/2021     Lab Results   Component Value Date    TRIG 90 03/06/2023    TRIG 61 12/12/2021     No results found for: CHOLHDL    Hemoglobin A1C  Lab Results   Component Value Date    HGBA1C 8 7 (H) 03/15/2023       Fasting Glucose  Lab Results   Component Value Date    GLUF 93 08/09/2022       Insulin     Thyroid  No results found for: TSH, H0MSTVN, K0TVRSU, THYROIDAB    Hepatic Function Panel  Lab Results   Component Value Date    ALT 14 08/09/2022    AST 16 08/09/2022    ALKPHOS 66 08/09/2022       Celiac Disease Antibody Panel  Endomysial IgA   Date Value Ref Range Status   04/21/2022 Negative Negative Final     Gliadin IgA   Date Value Ref Range Status   04/21/2022 39 (H) 0 - 19 units Final     Comment:                        Negative                   0 - 19                     Weak Positive             20 - 30                     Moderate to Strong Positive   >30     Gliadin IgG   Date Value Ref Range Status   04/21/2022 2 0 - 19 units Final     Comment:                        Negative                   0 - 19                     Weak Positive             20 - 30                     Moderate to Strong Positive   >30     IgA   Date Value Ref Range Status   04/21/2022 304 90 - 386 mg/dL Final     TISSUE TRANSGLUTAMINASE IGA   Date Value Ref Range Status   04/21/2022 <2 0 - 3 U/mL Final     Comment:                                   Negative        0 -  3                                Weak Positive   4 - 10                                Positive           >10   Tissue Transglutaminase (tTG) has been identified   as the endomysial antigen  Studies have demonstr-   ated that endomysial IgA antibodies have over 99%   specificity for gluten sensitive enteropathy        Iron  Lab Results   Component Value Date    IRON 87 04/03/2022    TIBC 247 (L) 04/03/2022 Rere Ramesh, [address] [facility]  Kahlil Black  [address] [place] [phone]

## 2023-06-09 ENCOUNTER — TELEPHONE (OUTPATIENT)
Dept: FAMILY MEDICINE CLINIC | Facility: CLINIC | Age: 53
End: 2023-06-09

## 2023-06-22 ENCOUNTER — OFFICE VISIT (OUTPATIENT)
Dept: FAMILY MEDICINE CLINIC | Facility: CLINIC | Age: 53
End: 2023-06-22
Payer: COMMERCIAL

## 2023-06-22 VITALS
HEART RATE: 61 BPM | RESPIRATION RATE: 16 BRPM | DIASTOLIC BLOOD PRESSURE: 86 MMHG | BODY MASS INDEX: 28.55 KG/M2 | OXYGEN SATURATION: 98 % | SYSTOLIC BLOOD PRESSURE: 118 MMHG | WEIGHT: 188.4 LBS | TEMPERATURE: 98.5 F | HEIGHT: 68 IN

## 2023-06-22 DIAGNOSIS — R42 VERTIGO: ICD-10-CM

## 2023-06-22 DIAGNOSIS — K50.90 CROHN'S DISEASE WITHOUT COMPLICATION, UNSPECIFIED GASTROINTESTINAL TRACT LOCATION (HCC): ICD-10-CM

## 2023-06-22 DIAGNOSIS — E11.65 TYPE 2 DIABETES MELLITUS WITH HYPERGLYCEMIA, WITHOUT LONG-TERM CURRENT USE OF INSULIN (HCC): Primary | ICD-10-CM

## 2023-06-22 LAB
CREAT UR-MCNC: 145 MG/DL
LEFT EYE DIABETIC RETINOPATHY: NORMAL
LEFT EYE IMAGE QUALITY: NORMAL
LEFT EYE MACULAR EDEMA: NORMAL
LEFT EYE OTHER RETINOPATHY: NORMAL
MICROALBUMIN UR-MCNC: 5.4 MG/L (ref 0–20)
MICROALBUMIN/CREAT 24H UR: 4 MG/G CREATININE (ref 0–30)
RIGHT EYE DIABETIC RETINOPATHY: NORMAL
RIGHT EYE IMAGE QUALITY: NORMAL
RIGHT EYE MACULAR EDEMA: NORMAL
RIGHT EYE OTHER RETINOPATHY: NORMAL
SEVERITY (EYE EXAM): NORMAL

## 2023-06-22 PROCEDURE — 82570 ASSAY OF URINE CREATININE: CPT | Performed by: NURSE PRACTITIONER

## 2023-06-22 PROCEDURE — 92250 FUNDUS PHOTOGRAPHY W/I&R: CPT | Performed by: NURSE PRACTITIONER

## 2023-06-22 PROCEDURE — 82043 UR ALBUMIN QUANTITATIVE: CPT | Performed by: NURSE PRACTITIONER

## 2023-06-22 PROCEDURE — 99214 OFFICE O/P EST MOD 30 MIN: CPT | Performed by: NURSE PRACTITIONER

## 2023-06-22 NOTE — PATIENT INSTRUCTIONS
Type 2 Diabetes Management for Adults   AMBULATORY CARE:   Type 2 diabetes  is a disease that affects how your body uses glucose (sugar)  Either your body cannot make enough insulin, or it cannot use the insulin correctly  It is important to keep diabetes controlled to prevent damage to your heart, blood vessels, and other organs  Management will help you feel well and enjoy your daily activities  Your diabetes care team providers can help you make a plan to fit diabetes care into your schedule  Your plan can change over time to fit your needs and your family's needs  Have someone call your local emergency number (911 in the [address]) if:   • You cannot be woken  • You have signs of diabetic ketoacidosis:     ? confusion, fatigue    ? vomiting    ? rapid heartbeat    ? fruity smelling breath    ? extreme thirst    ? dry mouth and skin    • You have any of the following signs of a heart attack:      ? Squeezing, pressure, or pain in your chest    ? You may  also have any of the following:     - Discomfort or pain in your back, neck, jaw, stomach, or arm    - Shortness of breath    - Nausea or vomiting    - Lightheadedness or a sudden cold sweat    • You have any of the following signs of a stroke:      ? Numbness or drooping on one side of your face     ? Weakness in an arm or leg    ? Confusion or difficulty speaking    ? Dizziness, a severe headache, or vision loss    Call your doctor or diabetes care team provider if:   • You have a sore or wound that will not heal     • You have a change in the amount you urinate  • Your blood sugar levels are higher than your target goals  • You often have lower blood sugar levels than your target goals  • Your skin is red, dry, warm, or swollen  • You have trouble coping with diabetes, or you feel anxious or depressed  • You have questions or concerns about your condition or care      What you need to know about high blood sugar levels:  High blood sugar levels may not cause any symptoms  You may feel more thirsty or urinate more often than usual  Over time, high blood sugar levels can damage your nerves, blood vessels, tissues, and organs  The following can increase your blood sugar levels:  • Large meals or large amounts of carbohydrates at one time    • Less physical activity    • Stress    • Illness    • A lower dose of diabetes medicine or insulin, or a late dose    What you need to know about low blood sugar levels:  Symptoms include feeling shaky, dizzy, irritable, or confused  You can prevent symptoms by keeping your blood sugar levels from going too low  • Treat a low blood sugar level right away:      ? Drink 4 ounces of juice or have 1 tube of glucose gel  ? Check your blood sugar level again 10 to 15 minutes later  ? When the level goes back to normal, eat a meal or snack to prevent another decrease  • Keep glucose gel, raisins, or hard candy with you at all times to treat a low blood sugar level  • Your blood sugar level can get too low if you take diabetes medicine or insulin and do not eat enough food  • If you use insulin, check your blood sugar level before you exercise  ? If your blood sugar level is below 100 mg/dL, eat 4 crackers or 2 ounces of raisins, or drink 4 ounces of juice  ? Check your level every 30 minutes if you exercise longer than 1 hour  ? You may need a snack during or after exercise  What you can do to manage your blood sugar levels:   • Check your blood sugar levels as directed and as needed  Several items are available to use to check your levels  You may need to check by testing a drop of blood in a glucose monitor  You may instead be given a continuous glucose monitoring (CGM) device  The device is worn at all times  The CGM checks your blood sugar level every 5 minutes  It sends results to an electronic device such as a smart phone  A CGM can be used with or without an insulin pump   You and your diabetes care team providers will decide on the best method for you  The goal for blood sugar levels before meals  is between 80 and 130 mg/dL and 2 hours after eating  is lower than 180 mg/dL  • Make healthy food choices  Work with a dietitian to develop a meal plan that works for you and your schedule  A dietitian can help you learn how to eat the right amount of carbohydrates during your meals and snacks  Carbohydrates can raise your blood sugar level if you eat too many at one time  Examples of foods that contain carbohydrates are breads, cereals, rice, pasta, and sweets  • Eat high-fiber foods as directed  Fiber helps improve blood sugar levels  Fiber also lowers your risk for heart disease and other problems diabetes can cause  Examples of high-fiber foods include vegetables, whole-grain bread, and beans such as encinas beans  Your dietitian can tell you how much fiber to have each day  • Get regular physical activity  Physical activity can help you get to your target blood sugar level goal and manage your weight  Get at least 150 minutes of moderate to vigorous aerobic physical activity each week  Do not miss more than 2 days in a row  Do not sit longer than 30 minutes at a time  Your healthcare provider can help you create an activity plan  The plan can include the best activities for you and can help you build your strength and endurance  • Maintain a healthy weight  Ask your team what a healthy weight is for you  A healthy weight can help you control diabetes and prevent heart disease  Ask your team to help you create a weight loss plan, if needed  Weight loss can help make a difference in managing diabetes  Your team will help you set a weight-loss goal, such as 10 to 15 pounds, or 5% of your extra weight  Together you and your team can set manageable weight loss goals  • Take your diabetes medicine or insulin as directed    You may need diabetes medicine, insulin, or both to help control your blood sugar levels  Your healthcare provider will teach you how and when to take your diabetes medicine or insulin  You will also be taught about side effects oral diabetes medicine can cause  Insulin may be injected or given through a pump or pen  You and your providers will decide on the best method for you:    ? An insulin pump  is an implanted device that gives your insulin 24 hours a day  An insulin pump prevents the need for multiple insulin injections in a day  ? An insulin pen  is a device prefilled with the right amount of insulin  ? You and your family members will be taught how to draw up and give insulin  if this is the best method for you  Your providers will also teach you how to dispose of needles and syringes  ? You will learn how much insulin you need  and when to give it  You will be taught when not to give insulin  You will also be taught what to do if your blood sugar level drops too low  This may happen if you take insulin and do not eat the right amount of carbohydrates  More ways to manage type 2 diabetes:   • Wear medical alert identification  Wear medical alert jewelry or carry a card that says you have diabetes  Ask your provider where to get these items  • Do not smoke  Nicotine and other chemicals in cigarettes and cigars can cause lung and blood vessel damage  It also makes it more difficult to manage your diabetes  Ask your provider for information if you currently smoke and need help to quit  Do not use e-cigarettes or smokeless tobacco in place of cigarettes or to help you quit  They still contain nicotine  • Check your feet each day for cuts, scratches, calluses, or other wounds  Look for redness and swelling, and feel for warmth  Wear shoes that fit well  Check your shoes for rocks or other objects that can hurt your feet  Do not walk barefoot or wear shoes without socks   Wear cotton socks to help keep your feet dry  • Ask about vaccines you may need  You have a higher risk for serious illness if you get the flu, pneumonia, COVID-19, or hepatitis  Ask your provider if you should get vaccines to prevent these or other diseases, and when to get the vaccines  • Talk to your provider if you become stressed about diabetes care  Sometimes being able to fit diabetes care into your life can cause increased stress  The stress can cause you not to take care of yourself properly  Your care team providers can help by offering tips about self-care  Your providers may suggest you talk to a mental health provider who can listen and offer help with self-care issues  • Have your A1c checked as directed  Your provider may check your A1c every 3 months, or 2 times each year if your diabetes is controlled  An A1c test shows the average amount of sugar in your blood over the past 2 to 3 months  Your provider will tell you what your A1c level should be  • Have screening tests as directed  Your provider may recommend screening for complications of diabetes and other conditions that may develop  Some screenings may begin right away and some may happen within the first 5 years of diagnosis:    ? Examples of diabetes complications  include kidney problems, high cholesterol, high blood pressure, blood vessel problems, eye problems, and sleep apnea  ? You may be screened for a low vitamin B level  if you take oral diabetes medicine for a long time  ? Women of childbearing years may be screened  for polycystic ovarian syndrome (PCOS)  Follow up with your doctor or diabetes care team providers as directed: You may need to have blood tests done before your follow-up visit  The test results will show if changes need to be made in your treatment or self-care  Talk to your provider if you cannot afford your medicine  Write down your questions so you remember to ask them during your visits    © Copyright [employer] 2022 Information is for End User's use only and may not be sold, redistributed or otherwise used for commercial purposes  The above information is an  only  It is not intended as medical advice for individual conditions or treatments  Talk to your doctor, nurse or pharmacist before following any medical regimen to see if it is safe and effective for you  Foot Care for People with Diabetes   AMBULATORY CARE:   What you need to know about foot care:  Long-term high blood sugar levels can damage the blood vessels and nerves in your legs and feet  This damage makes it hard to feel pressure, pain, temperature, and touch  You may not be able to feel a cut or sore, or shoes that are too tight  Foot care is needed to prevent serious problems, such as an infection or amputation  Diabetes may cause your toes to become crooked or curved under  These changes may affect the way you walk and can lead to increased pressure on your foot  The pressure can decrease blood flow to your feet  Lack of blood flow increases your risk for a foot ulcer  Call your care team provider if:   • Your feet become numb, weak, or hard to move  • You have pus draining from a sore on your foot  • You have a wound on your foot that gets bigger, deeper, or does not heal     • You see blisters, cuts, scratches, calluses, or sores on your foot  • You have a fever, and your feet become red, warm, and swollen  • Your toenails become thick, curled, or yellow  • You find it hard to check your feet because your vision is poor  • You have questions or concerns about your condition or care  How to care for your feet:   • Check your feet each day  Look at your whole foot, including the bottom, and between and under your toes  Check for wounds, corns, and calluses  Use a mirror to see the bottom of your feet  The skin on your feet may be shiny, tight, or darker than normal  Your feet may also be cold and pale   Feel your feet by running your hands along the tops, bottoms, sides, and between your toes  Redness, swelling, and warmth are signs of blood flow problems that can lead to a foot ulcer  Do not try to remove corns or calluses yourself  Do not ignore small problems, such as dry skin or small wounds  These can become life-threatening over time without proper care  • Wash your feet each day with soap and warm water  Do not use hot water, because this can injure your foot  Dry your feet gently with a towel after you wash them  Dry between and under your toes  • Apply lotion or a moisturizer on your dry feet  Ask your care team provider what lotions are best to use  Do not put lotion or moisturizer between your toes  Moisture between your toes could lead to skin breakdown  • Cut your toenails correctly  File or cut your toenails straight across  Use a soft brush to clean around your toenails  If your toenails are very thick, you may need to have a care team provider or specialist cut them  • Protect your feet  Do not walk barefoot or wear your shoes without socks  Check your shoes for rocks or other objects that can hurt your feet  Wear cotton socks to help keep your feet dry  Wear socks without toe seams, or wear them with the seams inside out  Change your socks each day  Do not wear socks that are dirty or damp  • Wear shoes that fit well  Wear shoes that do not rub against any area of your feet  Your shoes should be ½ to ¾ inch (1 to 2 centimeters) longer than your feet  Your shoes should also have extra space around the widest part of your feet  Walking or athletic shoes with laces or straps that adjust are best  Ask your care team provider for help to choose shoes that fit you best  Ask your provider if you need to wear an insert, orthotic, or bandage on your feet  • Go to your follow-up visits  Your care team provider will do a foot exam at least 1 time each year   You may need a foot exam more often if you have nerve damage, foot deformities, or ulcers  Your provider will check for nerve damage and how well you can feel your feet  Your provider will check your shoes to see if they fit well  • Do not smoke  Smoking can damage your blood vessels and put you at increased risk for foot ulcers  Ask your care team provider for information if you currently smoke and need help to quit  E-cigarettes or smokeless tobacco still contain nicotine  Talk to your care team provider before you use these products  Follow up with your diabetes care team provider or foot specialist as directed: You will need to have your feet checked at least 1 time each year  You may need a foot exam more often if you have nerve damage, foot deformities, or ulcers  Write down your questions so you remember to ask them during your visits  © Copyright [employer] 2022 Information is for End User's use only and may not be sold, redistributed or otherwise used for commercial purposes  The above information is an  only  It is not intended as medical advice for individual conditions or treatments  Talk to your doctor, nurse or pharmacist before following any medical regimen to see if it is safe and effective for you  What to Do if Your Blood Sugar is Low   AMBULATORY CARE:   Low blood sugar levels  (hypoglycemia) can happen with Type 1 and Type 2 diabetes  Low levels are more likely to happen if you use insulin  Hypoglycemia can cause you to have falls, accidents, and injuries  A blood sugar level that gets too low can lead to seizures, coma, and death  Learn to recognize the symptoms early so you can get treatment quickly  When your blood sugar is low you may feel:  • Sweaty    • Nervous or shaky    • Anxious or irritable    • Confused    • A fast, pounding heartbeat    • Extremely hungry    Have someone call your local emergency number (911 in the [address] [place] [address],[address]) if:   • You cannot be woken  • You have a seizure      Call your doctor if:   • You have symptoms of a low blood sugar level, such as trouble thinking, sweating, or a pounding heartbeat  • Your blood sugar level is lower than normal and it does not improve with treatment  • You often have lower blood sugar levels than your target goals  • You have trouble coping with your illness, or you feel anxious or depressed  • You have questions or concerns about your condition or care  What to do if you have symptoms of low blood sugar:   • Check your blood sugar level, if possible  Your blood sugar level is too low if it is at or below 70 mg/dL  • Eat or drink 15 grams of fast-acting carbohydrate  Fast-acting carbohydrates will raise your blood sugar level quickly  Examples of 15 grams of fast-acting carbohydrates:     ? 4 ounces (½ cup) of fruit juice     ? 4 ounces of regular soda    ? 2 tablespoons of raisins     ? 1 tube of glucose gel or 3 to 4 glucose tablets       • Check your blood sugar level 15 minutes later  If the level is still low (less than 100 mg/dL), eat another 15 grams of carbohydrate  When the level returns to 100 mg/dL, eat a snack or meal that contains carbohydrates  This will help prevent another drop in blood sugar  • Teach people close to you how to use your glucagon kit  Your blood sugar may be too low for you to be awake  People need to know when and how to use your kit  Prevent low blood sugar levels:  Prevent low blood sugar by knowing what increases your risk  Ask your healthcare provider for ways to prevent low blood sugar levels  Any of the following can increase your risk of low blood sugar:  • Fasting for tests or procedures    • During or after intense exercise    • Late or postponed meals    • Sleeping (you may need a bedtime snack)     • Drinking alcohol if you use insulin or insulin releasing pills    Follow up with your doctor as directed:  Write down your questions so you remember to ask them during your visits    © Copyright [employer] 2022 Information is for End User's use only and may not be sold, redistributed or otherwise used for commercial purposes  The above information is an  only  It is not intended as medical advice for individual conditions or treatments  Talk to your doctor, nurse or pharmacist before following any medical regimen to see if it is safe and effective for you

## 2023-06-22 NOTE — PROGRESS NOTES
FAMILY PRACTICE OFFICE VISIT       NAME: Bentley Triplett  AGE: 45 y o  SEX: male       : 1970        MRN: [id number]    DATE: 2023  TIME: 2:34 PM    Assessment and Plan   1  Type 2 diabetes mellitus with hyperglycemia, without long-term current use of insulin (HCC)  Assessment & Plan:  Stable  Con tmeds    Lab Results   Component Value Date    HGBA1C 8 7 (H) 03/15/2023       Orders:  -     IRIS Diabetic eye exam  -     Hemoglobin A1C; Future; Expected date: 2023  -     Comprehensive metabolic panel; Future; Expected date: 2023  -     Lipid Panel with Direct LDL reflex; Future; Expected date: 2023  -     Albumin / creatinine urine ratio; Future  -     Albumin / creatinine urine ratio    2  Crohn's disease without complication, unspecified gastrointestinal tract location [facility])  Assessment & Plan:  Stable  Cont meds  Cont f/u with gi      Orders:  -     Hemoglobin A1C; Future; Expected date: 2023  -     Comprehensive metabolic panel; Future; Expected date: 2023  -     Lipid Panel with Direct LDL reflex; Future; Expected date: 2023    3  Vertigo  Assessment & Plan:  Stable      Orders:  -     Hemoglobin A1C; Future; Expected date: 2023  -     Comprehensive metabolic panel; Future; Expected date: 2023  -     Lipid Panel with Direct LDL reflex; Future; Expected date: 2023               Chief Complaint     Chief Complaint   Patient presents with   • Follow-up     Pt presents for f/u Type 2 Diabetes mellitus       History of Present Illness   Bentley Triplett is a 45y o -year-old male who is here for f/u to diabetes, chrohns  No episodes of vertigo  Doing well overall  Feels well      Review of Systems   Review of Systems   Constitutional: Negative for fatigue and fever  HENT: Negative for congestion, postnasal drip and rhinorrhea  Eyes: Negative for photophobia and visual disturbance  Respiratory: Negative for cough, shortness of breath and wheezing  "  Cardiovascular: Negative for chest pain and palpitations  Gastrointestinal: Negative for constipation, diarrhea, nausea and vomiting  Genitourinary: Negative for dysuria and frequency  Musculoskeletal: Negative for arthralgias and myalgias  Skin: Negative for rash  Neurological: Negative for dizziness, light-headedness and headaches  Hematological: Negative for adenopathy  Psychiatric/Behavioral: Negative for dysphoric mood and sleep disturbance  The patient is not nervous/anxious  Active Problem List     Patient Active Problem List   Diagnosis   • Crohn's disease without complication ([place] [address] )   • Gastroesophageal reflux disease   • Vertigo   • Elevated serum creatinine   • Elevated glucose   • Type 2 diabetes mellitus with hyperglycemia, without long-term current use of insulin [facility])         Past Medical History:  Past Medical History:   Diagnosis Date   • Colitis    • Colon polyp    • Crohn disease ([place] [address] )        Past Surgical History:  Past Surgical History:   Procedure Laterality Date   • BACK SURGERY      lumbar discectomy    • COLON SURGERY  2002 11\" inrestines removed     • COLONOSCOPY     • SKULL FRACTURE ELEVATION         Family History:  Family History   Problem Relation Age of Onset   • Coronary artery disease Mother    • Diabetes Mother    • Coronary artery disease Father        Social History:  Social History     Socioeconomic History   • Marital status: Single     Spouse name: Not on file   • Number of children: Not on file   • Years of education: Not on file   • Highest education level: Not on file   Occupational History   • Not on file   Tobacco Use   • Smoking status: Never   • Smokeless tobacco: Never   Vaping Use   • Vaping Use: Never used   Substance and Sexual Activity   • Alcohol use: Yes     Comment: socially   • Drug use: Yes     Types: Marijuana   • Sexual activity: Yes     Partners: Female   Other Topics Concern   • Not on file   Social History Narrative   • Not on file " Social Determinants of Health     Financial Resource Strain: Not on file   Food Insecurity: Not on file   Transportation Needs: Not on file   Physical Activity: Not on file   Stress: Not on file   Social Connections: Not on file   Intimate Partner Violence: Not on file   Housing Stability: Not on file       Objective     Vitals:    06/22/23 1334   BP: 118/86   Pulse: 61   Resp: 16   Temp: 98 5 °F (36 9 °C)   SpO2: 98%     Wt Readings from Last 3 Encounters:   06/22/23 85 5 kg (188 lb 6 4 oz)   03/31/23 82 kg (180 lb 12 8 oz)   03/24/23 83 kg (183 lb)       Physical Exam  Vitals and nursing note reviewed  HENT:      Head: Normocephalic and atraumatic  Right Ear: Tympanic membrane, ear canal and external ear normal       Left Ear: Tympanic membrane, ear canal and external ear normal       Nose: Nose normal       Mouth/Throat:      Mouth: Mucous membranes are moist    Eyes:      Conjunctiva/sclera: Conjunctivae normal    Cardiovascular:      Rate and Rhythm: Normal rate and regular rhythm  Heart sounds: Normal heart sounds  Pulmonary:      Effort: Pulmonary effort is normal       Breath sounds: Normal breath sounds  Abdominal:      General: Bowel sounds are normal       Palpations: Abdomen is soft  Musculoskeletal:         General: Normal range of motion  Cervical back: Normal range of motion and neck supple  Skin:     General: Skin is warm and dry  Capillary Refill: Capillary refill takes less than 2 seconds  Neurological:      General: No focal deficit present  Mental Status: He is alert and oriented to person, place, and time  Psychiatric:         Mood and Affect: Mood normal          Behavior: Behavior normal          Thought Content:  Thought content normal          Judgment: Judgment normal          Pertinent Laboratory/Diagnostic Studies:  Lab Results   Component Value Date    BUN 14 08/09/2022    CREATININE 1 09 08/09/2022    CALCIUM 9 2 08/09/2022    K 4 3 08/09/2022 "   CO2 28 08/09/2022     (H) 08/09/2022     Lab Results   Component Value Date    ALT 14 08/09/2022    AST 16 08/09/2022    ALKPHOS 66 08/09/2022       Lab Results   Component Value Date    WBC 4 79 03/06/2023    HGB 14 2 03/06/2023    HCT 41 7 03/06/2023    MCV 88 03/06/2023     03/06/2023       No results found for: \"TSH\"    No results found for: \"CHOL\"  Lab Results   Component Value Date    TRIG 90 03/06/2023     Lab Results   Component Value Date    HDL 52 03/06/2023     Lab Results   Component Value Date    LDLCALC 102 (H) 03/06/2023     Lab Results   Component Value Date    HGBA1C 8 7 (H) 03/15/2023       Results for orders placed or performed in visit on 03/15/23   TSH, 3rd generation with Free T4 reflex   Result Value Ref Range    TSH 3RD GENERATON 2 734 0 450 - 4 500 uIU/mL   HEMOGLOBIN A1C W/ EAG ESTIMATION   Result Value Ref Range    Hemoglobin A1C 8 7 (H) Normal 3 8-5 6%; PreDiabetic 5 7-6 4%; Diabetic >=6 5%; Glycemic control for adults with diabetes <7 0% %     mg/dl       Orders Placed This Encounter   Procedures   • IRIS Diabetic eye exam   • Hemoglobin A1C   • Comprehensive metabolic panel   • Lipid Panel with Direct LDL reflex   • Albumin / creatinine urine ratio       ALLERGIES:  No Known Allergies    Current Medications     Current Outpatient Medications   Medication Sig Dispense Refill   • Ascorbic Acid (vitamin C) 100 MG tablet Take 100 mg by mouth daily     • Blood Glucose Monitoring Suppl (OneTouch Verio Reflect) w/Device KIT Check blood sugars once daily  Please substitute with appropriate alternative as covered by patient's insurance  Dx: E11 65 1 kit 0   • Blood Glucose Monitoring Suppl (OneTouch Verio Reflect) w/Device KIT Check blood sugars once daily  Please substitute with appropriate alternative as covered by patient's insurance   Dx: E11 65 1 kit 0   • cholecalciferol (VITAMIN D3) 1,000 units tablet Take 1 tablet (1,000 Units total) by mouth daily 30 tablet 3   • " cholecalciferol (VITAMIN D3) 400 units tablet Take 400 Units by mouth daily     • glucose blood (OneTouch Verio) test strip Check blood sugars once daily  Please substitute with appropriate alternative as covered by patient's insurance  Dx: E11 65 100 each 3   • glucose blood (OneTouch Verio) test strip Check blood sugars once daily  Please substitute with appropriate alternative as covered by patient's insurance  Dx: E11 65 100 each 3   • OneTouch Delica Lancets 22O MISC Check blood sugars once daily  Please substitute with appropriate alternative as covered by patient's insurance  Dx: E11 65 100 each 3   • OneTouch Delica Lancets 06X MISC Check blood sugars once daily  Please substitute with appropriate alternative as covered by patient's insurance  Dx: E11 65 100 each 3   • meclizine (ANTIVERT) 25 mg tablet Take 1 tablet (25 mg total) by mouth 3 (three) times a day as needed for dizziness (Patient not taking: Reported on 6/22/2023) 30 tablet 0   • metFORMIN (GLUCOPHAGE) 500 mg tablet Take 1 tablet (500 mg total) by mouth 2 (two) times a day with meals (Patient not taking: Reported on 6/22/2023) 60 tablet 5   • omeprazole (PriLOSEC) 40 MG capsule Take 1 capsule (40 mg total) by mouth daily (Patient not taking: Reported on 6/22/2023) 30 capsule 3   • zinc gluconate 50 mg tablet Take 50 mg by mouth daily (Patient not taking: Reported on 6/22/2023)       No current facility-administered medications for this visit           Health Maintenance     Health Maintenance   Topic Date Due   • Kidney Health Evaluation: Albumin/Creatinine Ratio  Never done   • DM Eye Exam  Never done   • Kidney Health Evaluation: GFR  08/09/2023   • Influenza Vaccine (Season Ended) 09/01/2023   • HEMOGLOBIN A1C  09/15/2023   • COVID-19 Vaccine (3 - Booster for Leidy series) 09/22/2023 (Originally 3/5/2022)   • Pneumococcal Vaccine: Pediatrics (0 to 5 Years) and At-Risk Patients (6 to 59 Years) (1 - PCV) 03/26/2024 (Originally 11/8/1976)   • Depression Screening  02/15/2024   • Annual Physical  02/15/2024   • BMI: Followup Plan  02/16/2024   • Diabetic Foot Exam  03/26/2024   • Colorectal Cancer Screening  04/03/2024   • BMI: Adult  06/22/2024   • DTaP,Tdap,and Td Vaccines (2 - Td or Tdap) 11/17/2031   • HIV Screening  Completed   • Hepatitis C Screening  Completed   • HIB Vaccine  Aged Out   • IPV Vaccine  Aged Out   • Hepatitis A Vaccine  Aged Out   • Meningococcal ACWY Vaccine  Aged Out   • HPV Vaccine  Aged Out     Immunization History   Administered Date(s) Administered   • COVID-19 J&J ([employer]) vaccine 0 5 mL 04/12/2021, 01/08/2022   • Tdap 11/17/2021          BRI Metcalf

## 2023-10-16 ENCOUNTER — OFFICE VISIT (OUTPATIENT)
Dept: FAMILY MEDICINE CLINIC | Facility: CLINIC | Age: 53
End: 2023-10-16
Payer: COMMERCIAL

## 2023-10-16 VITALS
BODY MASS INDEX: 27.46 KG/M2 | TEMPERATURE: 97.4 F | WEIGHT: 181.2 LBS | HEART RATE: 60 BPM | RESPIRATION RATE: 16 BRPM | SYSTOLIC BLOOD PRESSURE: 120 MMHG | OXYGEN SATURATION: 98 % | DIASTOLIC BLOOD PRESSURE: 64 MMHG | HEIGHT: 68 IN

## 2023-10-16 DIAGNOSIS — E11.65 TYPE 2 DIABETES MELLITUS WITH HYPERGLYCEMIA, WITHOUT LONG-TERM CURRENT USE OF INSULIN (HCC): ICD-10-CM

## 2023-10-16 DIAGNOSIS — K50.90 CROHN'S DISEASE WITHOUT COMPLICATION, UNSPECIFIED GASTROINTESTINAL TRACT LOCATION (HCC): ICD-10-CM

## 2023-10-16 DIAGNOSIS — E11.9 TYPE 2 DIABETES MELLITUS WITHOUT COMPLICATION, WITHOUT LONG-TERM CURRENT USE OF INSULIN (HCC): Primary | ICD-10-CM

## 2023-10-16 DIAGNOSIS — R79.89 ELEVATED SERUM CREATININE: ICD-10-CM

## 2023-10-16 LAB — SL AMB POCT HEMOGLOBIN AIC: 6.4 (ref ?–6.5)

## 2023-10-16 PROCEDURE — 83036 HEMOGLOBIN GLYCOSYLATED A1C: CPT | Performed by: NURSE PRACTITIONER

## 2023-10-16 PROCEDURE — 99214 OFFICE O/P EST MOD 30 MIN: CPT | Performed by: NURSE PRACTITIONER

## 2023-10-16 NOTE — PROGRESS NOTES
Name: Derik Lozano      : 1970      MRN: [id number]  Encounter Provider: BRI Covarrubias  Encounter Date: 10/16/2023   Encounter department: [facility]     1. Type 2 diabetes mellitus without complication, without long-term current use of insulin ([facility])  Assessment & Plan:  Monitor off metformin and just diet for now  Labs in 4 months    Lab Results   Component Value Date    HGBA1C 6.4 10/16/2023         2. Elevated serum creatinine  -     Comprehensive metabolic panel; Future  -     CBC and differential; Future  -     Lipid Panel with Direct LDL reflex; Future    3. Crohn's disease without complication, unspecified gastrointestinal tract location [facility])  -     Comprehensive metabolic panel; Future  -     CBC and differential; Future  -     Lipid Panel with Direct LDL reflex; Future    4. Type 2 diabetes mellitus with hyperglycemia, without long-term current use of insulin ([facility])  Assessment & Plan:  Monitor off metformin and just diet for now  Labs in 4 months    Lab Results   Component Value Date    HGBA1C 6.4 10/16/2023       Orders:  -     POCT hemoglobin A1c  -     Comprehensive metabolic panel; Future  -     CBC and differential; Future  -     Lipid Panel with Direct LDL reflex; Future           Subjective     Here for f/u to chronic medical conditions  Doing well  Has not taken his diabetes meds for over 4 months  Labs reviewed  Diabetes well controlled off meds  Overall feeling well  Changed his diet and eating healthier        Review of Systems   Constitutional:  Negative for fatigue and fever. HENT:  Negative for congestion, postnasal drip and rhinorrhea. Eyes:  Negative for photophobia and visual disturbance. Respiratory:  Negative for cough, shortness of breath and wheezing. Cardiovascular:  Negative for chest pain and palpitations. Gastrointestinal:  Negative for constipation, diarrhea, nausea and vomiting.    Endocrine: Negative for polydipsia, polyphagia and polyuria. Genitourinary:  Negative for dysuria and frequency. Musculoskeletal:  Negative for arthralgias, joint swelling and myalgias. Skin:  Negative for rash. Neurological:  Negative for dizziness, light-headedness and headaches. Hematological:  Negative for adenopathy. Psychiatric/Behavioral:  Negative for dysphoric mood and sleep disturbance. The patient is not nervous/anxious. Past Medical History:   Diagnosis Date    Colitis     Colon polyp     Crohn disease [facility])      Past Surgical History:   Procedure Laterality Date    BACK SURGERY      lumbar discectomy     COLON SURGERY  2002    11" inrestines removed. COLONOSCOPY      SKULL FRACTURE ELEVATION       Family History   Problem Relation Age of Onset    Coronary artery disease Mother     Diabetes Mother     Coronary artery disease Father      Social History     Socioeconomic History    Marital status: Single     Spouse name: None    Number of children: None    Years of education: None    Highest education level: None   Occupational History    None   Tobacco Use    Smoking status: Never    Smokeless tobacco: Never   Vaping Use    Vaping Use: Never used   Substance and Sexual Activity    Alcohol use: Yes     Comment: socially    Drug use: Yes     Types: Marijuana    Sexual activity: Yes     Partners: Female   Other Topics Concern    None   Social History Narrative    None     Social Determinants of Health     Financial Resource Strain: Not on file   Food Insecurity: Not on file   Transportation Needs: Not on file   Physical Activity: Not on file   Stress: Not on file   Social Connections: Not on file   Intimate Partner Violence: Not on file   Housing Stability: Not on file     Current Outpatient Medications on File Prior to Visit   Medication Sig    Blood Glucose Monitoring Suppl (OneTouch Verio Reflect) w/Device KIT Check blood sugars once daily.  Please substitute with appropriate alternative as covered by patient's insurance. Dx: E11.65    glucose blood (OneTouch Verio) test strip Check blood sugars once daily. Please substitute with appropriate alternative as covered by patient's insurance. Dx: E11.65 (Patient not taking: Reported on 10/16/2023)    meclizine (ANTIVERT) 25 mg tablet Take 1 tablet (25 mg total) by mouth 3 (three) times a day as needed for dizziness (Patient not taking: Reported on 7/05/2854)    OneTouch Delica Lancets 88Z MISC Check blood sugars once daily. Please substitute with appropriate alternative as covered by patient's insurance. Dx: E11.65 (Patient not taking: Reported on 10/16/2023)    [DISCONTINUED] Ascorbic Acid (vitamin C) 100 MG tablet Take 100 mg by mouth daily (Patient not taking: Reported on 10/16/2023)    [DISCONTINUED] Blood Glucose Monitoring Suppl (OneTouch Verio Reflect) w/Device KIT Check blood sugars once daily. Please substitute with appropriate alternative as covered by patient's insurance. Dx: E11.65 (Patient not taking: Reported on 10/16/2023)    [DISCONTINUED] cholecalciferol (VITAMIN D3) 1,000 units tablet Take 1 tablet (1,000 Units total) by mouth daily (Patient not taking: Reported on 10/16/2023)    [DISCONTINUED] cholecalciferol (VITAMIN D3) 400 units tablet Take 400 Units by mouth daily (Patient not taking: Reported on 10/16/2023)    [DISCONTINUED] glucose blood (OneTouch Verio) test strip Check blood sugars once daily. Please substitute with appropriate alternative as covered by patient's insurance. Dx: E11.65 (Patient not taking: Reported on 10/16/2023)    [DISCONTINUED] metFORMIN (GLUCOPHAGE) 500 mg tablet Take 1 tablet (500 mg total) by mouth 2 (two) times a day with meals (Patient not taking: Reported on 6/22/2023)    [DISCONTINUED] omeprazole (PriLOSEC) 40 MG capsule Take 1 capsule (40 mg total) by mouth daily (Patient not taking: Reported on 6/22/2023)    [DISCONTINUED] OneTouch Delica Lancets 90T MISC Check blood sugars once daily.  Please substitute with appropriate alternative as covered by patient's insurance. Dx: E11.65 (Patient not taking: Reported on 10/16/2023)    [DISCONTINUED] zinc gluconate 50 mg tablet Take 50 mg by mouth daily (Patient not taking: Reported on 6/22/2023)     No Known Allergies  Immunization History   Administered Date(s) Administered    COVID-19 J&J ([employer]) vaccine 0.5 mL 04/12/2021, 01/08/2022    Tdap 11/17/2021       Objective     /64 (BP Location: Left arm, Patient Position: Sitting, Cuff Size: Large)   Pulse 60   Temp (!) 97.4 °F (36.3 °C) (Tympanic)   Resp 16   Ht 5' 8" (1.727 m)   Wt 82.2 kg (181 lb 3.2 oz)   SpO2 98%   BMI 27.55 kg/m²     Physical Exam  Vitals and nursing note reviewed. Constitutional:       Appearance: Normal appearance. HENT:      Head: Normocephalic and atraumatic. Right Ear: Tympanic membrane, ear canal and external ear normal.      Left Ear: Tympanic membrane, ear canal and external ear normal.      Nose: Nose normal.      Mouth/Throat:      Mouth: Mucous membranes are moist.   Eyes:      Conjunctiva/sclera: Conjunctivae normal.   Cardiovascular:      Rate and Rhythm: Normal rate and regular rhythm. Heart sounds: Normal heart sounds. Pulmonary:      Effort: Pulmonary effort is normal.      Breath sounds: Normal breath sounds. Abdominal:      General: Bowel sounds are normal.      Palpations: Abdomen is soft. Musculoskeletal:         General: Normal range of motion. Cervical back: Normal range of motion and neck supple. Skin:     General: Skin is warm and dry. Capillary Refill: Capillary refill takes less than 2 seconds. Neurological:      General: No focal deficit present. Mental Status: He is alert and oriented to person, place, and time.    Psychiatric:         Mood and Affect: Mood normal.         Behavior: Behavior normal.       BRI Jimenez

## 2023-10-17 NOTE — ASSESSMENT & PLAN NOTE
Monitor off metformin and just diet for now  Labs in 4 months    Lab Results   Component Value Date    HGBA1C 6.4 10/16/2023

## 2024-02-12 ENCOUNTER — TELEPHONE (OUTPATIENT)
Dept: FAMILY MEDICINE CLINIC | Facility: CLINIC | Age: 54
End: 2024-02-12

## 2024-02-12 NOTE — TELEPHONE ENCOUNTER
2/12 LM for pt in regards to apt for 2/13 we can switch to virtual visit or patient can reschedule, office closed to in person apts

## 2024-06-03 DIAGNOSIS — E11.9 TYPE 2 DIABETES MELLITUS WITHOUT COMPLICATION, WITHOUT LONG-TERM CURRENT USE OF INSULIN (HCC): ICD-10-CM

## 2024-06-03 DIAGNOSIS — K50.90 CROHN'S DISEASE WITHOUT COMPLICATION, UNSPECIFIED GASTROINTESTINAL TRACT LOCATION (HCC): ICD-10-CM

## 2024-06-03 DIAGNOSIS — K21.9 GASTROESOPHAGEAL REFLUX DISEASE, UNSPECIFIED WHETHER ESOPHAGITIS PRESENT: Primary | ICD-10-CM

## 2024-11-26 ENCOUNTER — OFFICE VISIT (OUTPATIENT)
Dept: FAMILY MEDICINE CLINIC | Facility: CLINIC | Age: 54
End: 2024-11-26
Payer: COMMERCIAL

## 2024-11-26 VITALS
TEMPERATURE: 98 F | DIASTOLIC BLOOD PRESSURE: 70 MMHG | OXYGEN SATURATION: 97 % | RESPIRATION RATE: 18 BRPM | WEIGHT: 165 LBS | HEIGHT: 68 IN | HEART RATE: 70 BPM | BODY MASS INDEX: 25.01 KG/M2 | SYSTOLIC BLOOD PRESSURE: 120 MMHG

## 2024-11-26 DIAGNOSIS — E11.65 TYPE 2 DIABETES MELLITUS WITH HYPERGLYCEMIA, WITHOUT LONG-TERM CURRENT USE OF INSULIN (HCC): ICD-10-CM

## 2024-11-26 DIAGNOSIS — Z00.00 ANNUAL PHYSICAL EXAM: Primary | ICD-10-CM

## 2024-11-26 DIAGNOSIS — R73.01 IMPAIRED FASTING GLUCOSE: ICD-10-CM

## 2024-11-26 DIAGNOSIS — K50.90 CROHN'S DISEASE WITHOUT COMPLICATION, UNSPECIFIED GASTROINTESTINAL TRACT LOCATION (HCC): ICD-10-CM

## 2024-11-26 DIAGNOSIS — Z12.11 COLON CANCER SCREENING: ICD-10-CM

## 2024-11-26 DIAGNOSIS — R59.0 AXILLARY LYMPHADENOPATHY: ICD-10-CM

## 2024-11-26 DIAGNOSIS — Z12.5 SCREENING FOR MALIGNANT NEOPLASM OF PROSTATE: ICD-10-CM

## 2024-11-26 DIAGNOSIS — R51.9 NEW ONSET OF HEADACHES AFTER AGE 50: ICD-10-CM

## 2024-11-26 DIAGNOSIS — K21.9 GASTROESOPHAGEAL REFLUX DISEASE, UNSPECIFIED WHETHER ESOPHAGITIS PRESENT: ICD-10-CM

## 2024-11-26 PROBLEM — E11.9 TYPE 2 DIABETES MELLITUS WITHOUT COMPLICATION, WITHOUT LONG-TERM CURRENT USE OF INSULIN (HCC): Status: RESOLVED | Noted: 2023-03-26 | Resolved: 2024-11-26

## 2024-11-26 LAB — SL AMB POCT HEMOGLOBIN AIC: 6 (ref ?–6.5)

## 2024-11-26 PROCEDURE — 99396 PREV VISIT EST AGE 40-64: CPT | Performed by: NURSE PRACTITIONER

## 2024-11-26 PROCEDURE — 83036 HEMOGLOBIN GLYCOSYLATED A1C: CPT | Performed by: NURSE PRACTITIONER

## 2024-11-26 NOTE — ASSESSMENT & PLAN NOTE
Orders:    Comprehensive metabolic panel; Future    CBC and differential; Future    TSH, 3rd generation with Free T4 reflex; Future    Lipid Panel with Direct LDL reflex; Future    Urinalysis with microscopic; Future

## 2024-11-26 NOTE — PATIENT INSTRUCTIONS
"Patient Education     Routine physical for adults   The Basics   Written by the doctors and editors at [facility]   What is a physical? -- A physical is a routine visit, or \"check-up,\" with your doctor. You might also hear it called a \"wellness visit\" or \"preventive visit.\"  During each visit, the doctor will:   Ask about your physical and mental health   Ask about your habits, behaviors, and lifestyle   Do an exam   Give you vaccines if needed   Talk to you about any medicines you take   Give advice about your health   Answer your questions  Getting regular check-ups is an important part of taking care of your health. It can help your doctor find and treat any problems you have. But it's also important for preventing health problems.  A routine physical is different from a \"sick visit.\" A sick visit is when you see a doctor because of a health concern or problem. Since physicals are scheduled ahead of time, you can think about what you want to ask the doctor.  How often should I get a physical? -- It depends on your age and health. In general, for people age 21 years and older:   If you are younger than 50 years, you might be able to get a physical every 3 years.   If you are 50 years or older, your doctor might recommend a physical every year.  If you have an ongoing health condition, like diabetes or high blood pressure, your doctor will probably want to see you more often.  What happens during a physical? -- In general, each visit will include:   Physical exam - The doctor or nurse will check your height, weight, heart rate, and blood pressure. They will also look at your eyes and ears. They will ask about how you are feeling and whether you have any symptoms that bother you.   Medicines - It's a good idea to bring a list of all the medicines you take to each doctor visit. Your doctor will talk to you about your medicines and answer any questions. Tell them if you are having any side effects that bother you. You " "should also tell them if you are having trouble paying for any of your medicines.   Habits and behaviors - This includes:   Your diet   Your exercise habits   Whether you smoke, drink alcohol, or use drugs   Whether you are sexually active   Whether you feel safe at home  Your doctor will talk to you about things you can do to improve your health and lower your risk of health problems. They will also offer help and support. For example, if you want to quit smoking, they can give you advice and might prescribe medicines. If you want to improve your diet or get more physical activity, they can help you with this, too.   Lab tests, if needed - The tests you get will depend on your age and situation. For example, your doctor might want to check your:   Cholesterol   Blood sugar   Iron level   Vaccines - The recommended vaccines will depend on your age, health, and what vaccines you already had. Vaccines are very important because they can prevent certain serious or deadly infections.   Discussion of screening - \"Screening\" means checking for diseases or other health problems before they cause symptoms. Your doctor can recommend screening based on your age, risk, and preferences. This might include tests to check for:   Cancer, such as breast, prostate, cervical, ovarian, colorectal, prostate, lung, or skin cancer   Sexually transmitted infections, such as chlamydia and gonorrhea   Mental health conditions like depression and anxiety  Your doctor will talk to you about the different types of screening tests. They can help you decide which screenings to have. They can also explain what the results might mean.   Answering questions - The physical is a good time to ask the doctor or nurse questions about your health. If needed, they can refer you to other doctors or specialists, too.  Adults older than 65 years often need other care, too. As you get older, your doctor will talk to you about:   How to prevent falling at " home   Hearing or vision tests   Memory testing   How to take your medicines safely   Making sure that you have the help and support you need at home  All topics are updated as new evidence becomes available and our peer review process is complete.  This topic retrieved from [employer] on: May 02, 2024.  Topic 506296 Version 1.0  Release: 32.4.3 - C32.122  © 2024 UpToDate, Inc. and/or its affiliates. All rights reserved.  Consumer Information Use and Disclaimer   Disclaimer: This generalized information is a limited summary of diagnosis, treatment, and/or medication information. It is not meant to be comprehensive and should be used as a tool to help the user understand and/or assess potential diagnostic and treatment options. It does NOT include all information about conditions, treatments, medications, side effects, or risks that may apply to a specific patient. It is not intended to be medical advice or a substitute for the medical advice, diagnosis, or treatment of a health care provider based on the health care provider's examination and assessment of a patient's specific and unique circumstances. Patients must speak with a health care provider for complete information about their health, medical questions, and treatment options, including any risks or benefits regarding use of medications. This information does not endorse any treatments or medications as safe, effective, or approved for treating a specific patient. UpToDate, Inc. and its affiliates disclaim any warranty or liability relating to this information or the use thereof.The use of this information is governed by the Terms of Use, available at https://www.[employer] Scientificuwer.com/en/know/clinical-effectiveness-terms. 2024© UpToDate, Inc. and its affiliates and/or licensors. All rights reserved.  Copyright   © 2024 UpToDate, Inc. and/or its affiliates. All rights reserved.    Patient Education     Routine physical for adults   The Basics   Written by the  "doctors and editors at [facility]   What is a physical? -- A physical is a routine visit, or \"check-up,\" with your doctor. You might also hear it called a \"wellness visit\" or \"preventive visit.\"  During each visit, the doctor will:   Ask about your physical and mental health   Ask about your habits, behaviors, and lifestyle   Do an exam   Give you vaccines if needed   Talk to you about any medicines you take   Give advice about your health   Answer your questions  Getting regular check-ups is an important part of taking care of your health. It can help your doctor find and treat any problems you have. But it's also important for preventing health problems.  A routine physical is different from a \"sick visit.\" A sick visit is when you see a doctor because of a health concern or problem. Since physicals are scheduled ahead of time, you can think about what you want to ask the doctor.  How often should I get a physical? -- It depends on your age and health. In general, for people age 21 years and older:   If you are younger than 50 years, you might be able to get a physical every 3 years.   If you are 50 years or older, your doctor might recommend a physical every year.  If you have an ongoing health condition, like diabetes or high blood pressure, your doctor will probably want to see you more often.  What happens during a physical? -- In general, each visit will include:   Physical exam - The doctor or nurse will check your height, weight, heart rate, and blood pressure. They will also look at your eyes and ears. They will ask about how you are feeling and whether you have any symptoms that bother you.   Medicines - It's a good idea to bring a list of all the medicines you take to each doctor visit. Your doctor will talk to you about your medicines and answer any questions. Tell them if you are having any side effects that bother you. You should also tell them if you are having trouble paying for any of your " "medicines.   Habits and behaviors - This includes:   Your diet   Your exercise habits   Whether you smoke, drink alcohol, or use drugs   Whether you are sexually active   Whether you feel safe at home  Your doctor will talk to you about things you can do to improve your health and lower your risk of health problems. They will also offer help and support. For example, if you want to quit smoking, they can give you advice and might prescribe medicines. If you want to improve your diet or get more physical activity, they can help you with this, too.   Lab tests, if needed - The tests you get will depend on your age and situation. For example, your doctor might want to check your:   Cholesterol   Blood sugar   Iron level   Vaccines - The recommended vaccines will depend on your age, health, and what vaccines you already had. Vaccines are very important because they can prevent certain serious or deadly infections.   Discussion of screening - \"Screening\" means checking for diseases or other health problems before they cause symptoms. Your doctor can recommend screening based on your age, risk, and preferences. This might include tests to check for:   Cancer, such as breast, prostate, cervical, ovarian, colorectal, prostate, lung, or skin cancer   Sexually transmitted infections, such as chlamydia and gonorrhea   Mental health conditions like depression and anxiety  Your doctor will talk to you about the different types of screening tests. They can help you decide which screenings to have. They can also explain what the results might mean.   Answering questions - The physical is a good time to ask the doctor or nurse questions about your health. If needed, they can refer you to other doctors or specialists, too.  Adults older than 65 years often need other care, too. As you get older, your doctor will talk to you about:   How to prevent falling at home   Hearing or vision tests   Memory testing   How to take your " medicines safely   Making sure that you have the help and support you need at home  All topics are updated as new evidence becomes available and our peer review process is complete.  This topic retrieved from [employer] on: May 02, 2024.  Topic 029031 Version 1.0  Release: 32.4.3 - C32.122  © 2024 UpToDate, Inc. and/or its affiliates. All rights reserved.  Consumer Information Use and Disclaimer   Disclaimer: This generalized information is a limited summary of diagnosis, treatment, and/or medication information. It is not meant to be comprehensive and should be used as a tool to help the user understand and/or assess potential diagnostic and treatment options. It does NOT include all information about conditions, treatments, medications, side effects, or risks that may apply to a specific patient. It is not intended to be medical advice or a substitute for the medical advice, diagnosis, or treatment of a health care provider based on the health care provider's examination and assessment of a patient's specific and unique circumstances. Patients must speak with a health care provider for complete information about their health, medical questions, and treatment options, including any risks or benefits regarding use of medications. This information does not endorse any treatments or medications as safe, effective, or approved for treating a specific patient. UpToDate, Inc. and its affiliates disclaim any warranty or liability relating to this information or the use thereof.The use of this information is governed by the Terms of Use, available at https://www.[employer]wer.com/en/know/clinical-effectiveness-terms. 2024© UpToDate, Inc. and its affiliates and/or licensors. All rights reserved.  Copyright   © 2024 UpToDate, Inc. and/or its affiliates. All rights reserved.

## 2024-11-26 NOTE — PROGRESS NOTES
Diabetic Foot Exam    Patient's shoes and socks removed.    Right Foot/Ankle   Right Foot Inspection  Skin Exam: skin normal, dry skin, callus and callus. No warmth, no erythema, no maceration, no abnormal color, no pre-ulcer and no ulcer.     Toe Exam: ROM and strength within normal limits.     Sensory   Monofilament testing: intact    Vascular  Capillary refills: < 3 seconds  The right DP pulse is 2+. The right PT pulse is 2+.     Left Foot/Ankle  Left Foot Inspection  Skin Exam: skin normal, dry skin and callus. No warmth, no erythema, no maceration, normal color, no pre-ulcer and no ulcer.     Toe Exam: ROM and strength within normal limits.     Sensory   Monofilament testing: intact    Vascular  Capillary refills: < 3 seconds  The left DP pulse is 2+. The left PT pulse is 2+.     Assign Risk Category  No deformity present  No loss of protective sensation  No weak pulses  Risk: 0    Adult Annual Physical  Name: Ferny Venegas      : 1970      MRN: [id number]  Encounter Provider: BRI Blanca  Encounter Date: 2024   Encounter department: [facility]    Assessment & Plan  Annual physical exam    Orders:    Comprehensive metabolic panel; Future    CBC and differential; Future    TSH, 3rd generation with Free T4 reflex; Future    Lipid Panel with Direct LDL reflex; Future    Urinalysis with microscopic; Future    Axillary lymphadenopathy    Orders:    US axilla; Future    Comprehensive metabolic panel; Future    CBC and differential; Future    TSH, 3rd generation with Free T4 reflex; Future    Lipid Panel with Direct LDL reflex; Future    Urinalysis with microscopic; Future    Crohn's disease without complication, unspecified gastrointestinal tract location (HCC)    Orders:    Comprehensive metabolic panel; Future    CBC and differential; Future    TSH, 3rd generation with Free T4 reflex; Future    Lipid Panel with Direct LDL reflex; Future    Urinalysis with microscopic;  "Future    Type 2 diabetes mellitus with hyperglycemia, without long-term current use of insulin ([facility])    Lab Results   Component Value Date    HGBA1C 6.0 2024     Orders:    POCT hemoglobin A1c    Albumin / creatinine urine ratio      Immunizations and preventive care screenings were discussed with patient today. Appropriate education was printed on patient's after visit summary.    {Prostate cancer screening - consider in males between age of 40-75 depending on age, race, and risk factors. There is difference in the guidelines in regards to the optimal age for screening.  USPSTF states to consider periodic screening in males between the age of 50 to 69. This text is informational and does not need to be selected but if you wish, you can insert standard documentation in your progress note by using F2 (Optional):94498}    Counseling:  {Annual Physical; Counselin}         History of Present Illness   {?Quick Links Encounters * My Last Note * Last Note in Specialty * Snapshot * Since Last Visit * History :78272}  Adult Annual Physical  Review of Systems  {Select to Display PMH (Optional):94261}    Objective {?Quick Links Trend Vitals * Enter New Vitals * Results Review * Timeline (Adult) * Labs * Imaging * Cardiology * Procedures * Lung Cancer Screening * Surgical eConsent :43936}  /70 (BP Location: Left arm, Patient Position: Sitting, Cuff Size: Standard)   Pulse 70   Temp 98 °F (36.7 °C) (Tympanic)   Resp 18   Ht 5' 8.11\" (1.73 m)   Wt 74.8 kg (165 lb)   SpO2 97%   BMI 25.01 kg/m²     Physical Exam  Cardiovascular:      Pulses: no weak pulses.           Dorsalis pedis pulses are 2+ on the right side and 2+ on the left side.        Posterior tibial pulses are 2+ on the right side and 2+ on the left side.   Feet:      Right foot:      Skin integrity: Callus and dry skin present. No ulcer, skin breakdown, erythema or warmth.      Left foot:      Skin integrity: Callus and dry skin present. " No ulcer, skin breakdown, erythema or warmth.     {Administrative / Billing Section (Optional):88502}

## 2024-11-26 NOTE — ASSESSMENT & PLAN NOTE
Lab Results   Component Value Date    HGBA1C 6.0 11/26/2024     Orders:    POCT hemoglobin A1c    Albumin / creatinine urine ratio

## 2024-11-26 NOTE — ASSESSMENT & PLAN NOTE
Orders:    US axilla; Future    Comprehensive metabolic panel; Future    CBC and differential; Future    TSH, 3rd generation with Free T4 reflex; Future    Lipid Panel with Direct LDL reflex; Future    Urinalysis with microscopic; Future

## 2024-12-01 PROBLEM — R51.9 NEW ONSET OF HEADACHES AFTER AGE 50: Status: ACTIVE | Noted: 2024-12-01

## 2024-12-02 NOTE — PROGRESS NOTES
Adult Annual Physical  Name: Ferny Venegas      : 1970      MRN: [id number]  Encounter Provider: BIR Blanca  Encounter Date: 2024   Encounter department: [facility]    Assessment & Plan  Annual physical exam    Orders:    Comprehensive metabolic panel; Future    CBC and differential; Future    TSH, 3rd generation with Free T4 reflex; Future    Lipid Panel with Direct LDL reflex; Future    Urinalysis with microscopic; Future    Axillary lymphadenopathy    Orders:    US axilla; Future    Comprehensive metabolic panel; Future    CBC and differential; Future    TSH, 3rd generation with Free T4 reflex; Future    Lipid Panel with Direct LDL reflex; Future    Urinalysis with microscopic; Future    Crohn's disease without complication, unspecified gastrointestinal tract location (HCC)    Orders:    Comprehensive metabolic panel; Future    CBC and differential; Future    TSH, 3rd generation with Free T4 reflex; Future    Lipid Panel with Direct LDL reflex; Future    Urinalysis with microscopic; Future    Type 2 diabetes mellitus with hyperglycemia, without long-term current use of insulin (HCC)    Lab Results   Component Value Date    HGBA1C 6.0 2024     Orders:    POCT hemoglobin A1c    Albumin / creatinine urine ratio    New onset of headaches after age 50    Orders:    MRI brain w wo contrast; Future    Comprehensive metabolic panel; Future    CBC and differential; Future    TSH, 3rd generation with Free T4 reflex; Future    Lipid Panel with Direct LDL reflex; Future    Urinalysis with microscopic; Future    Screening for malignant neoplasm of prostate    Orders:    PSA, Total Screen; Future    Impaired fasting glucose    Orders:    Comprehensive metabolic panel; Future    CBC and differential; Future    TSH, 3rd generation with Free T4 reflex; Future    Urinalysis with microscopic; Future    Gastroesophageal reflux disease, unspecified whether esophagitis  present    Orders:    Comprehensive metabolic panel; Future    CBC and differential; Future    TSH, 3rd generation with Free T4 reflex; Future    Lipid Panel with Direct LDL reflex; Future    Urinalysis with microscopic; Future    Colon cancer screening    Orders:    Ambulatory Referral to Gastroenterology; Future    BMI Counseling: Body mass index is 25.01 kg/m². The BMI is above normal. Nutrition recommendations include reducing portion sizes, decreasing overall calorie intake, 3-5 servings of fruits/vegetables daily, reducing fast food intake, consuming healthier snacks, decreasing soda and/or juice intake, moderation in carbohydrate intake, increasing intake of lean protein, reducing intake of saturated fat and trans fat, and reducing intake of cholesterol. Exercise recommendations include moderate aerobic physical activity for 150 minutes/week, exercising 3-5 times per week, and strength training exercises.   Immunizations and preventive care screenings were discussed with patient today. Appropriate education was printed on patient's after visit summary.    Discussed risks and benefits of prostate cancer screening. We discussed the controversial history of PSA screening for prostate cancer in the [place] as well as the risk of over detection and over treatment of prostate cancer by way of PSA screening.  The patient understands that PSA blood testing is an imperfect way to screen for prostate cancer and that elevated PSA levels in the blood may also be caused by infection, inflammation, prostatic trauma or manipulation, urological procedures, or by benign prostatic enlargement.    The role of the digital rectal examination in prostate cancer screening was also discussed and I discussed with him that there is large interobserver variability in the findings of digital rectal examination.    Counseling:  Alcohol/drug use: discussed moderation in alcohol intake, the recommendations for healthy alcohol use,  and avoidance of illicit drug use.  Dental Health: discussed importance of regular tooth brushing, flossing, and dental visits.  Injury prevention: discussed safety/seat belts, safety helmets, smoke detectors, carbon monoxide detectors, and smoking near bedding or upholstery.  Sexual health: discussed sexually transmitted diseases, partner selection, use of condoms, avoidance of unintended pregnancy, and contraceptive alternatives.  Exercise: the importance of regular exercise/physical activity was discussed. Recommend exercise 3-5 times per week for at least 30 minutes.       Depression Screening and Follow-up Plan: Patient was screened for depression during today's encounter. They screened negative with a PHQ-2 score of 0.    BMI Counseling: Body mass index is 25.01 kg/m². The BMI is above normal. Nutrition recommendations include reducing portion sizes, decreasing overall calorie intake, 3-5 servings of fruits/vegetables daily, reducing fast food intake, consuming healthier snacks, decreasing soda and/or juice intake, moderation in carbohydrate intake, increasing intake of lean protein, reducing intake of saturated fat and trans fat, and reducing intake of cholesterol. Exercise recommendations include moderate aerobic physical activity for 150 minutes/week, exercising 3-5 times per week, and strength training exercises.     History of Present Illness     Adult Annual Physical:  Patient presents for annual physical. Here for wellness exam  C/o headaches  Back of head radiating to front  This is new   Never had headaches before  C/o lymph node under right axilla  New  Never had before  Non painful  Blood sugars back to normal and not taking diabetes meds anymore  .     Diet and Physical Activity:  - Diet/Nutrition: well balanced diet.  - Exercise: no formal exercise.    Depression Screening:  - PHQ-2 Score: 0    General Health:  - Sleep: sleeps well.  - Hearing: normal hearing right ear and normal hearing left  "ear.  - Vision: no vision problems.  - Dental: regular dental visits and brushes teeth once daily.     Health:  - History of STDs: no.   - Urinary symptoms: none.     Advanced Care Planning:  - Has an advanced directive?: no    - Has a durable medical POA?: no    - ACP document given to patient?: no      Review of Systems   Constitutional:  Negative for fatigue and fever.   HENT:  Negative for congestion, postnasal drip and rhinorrhea.    Eyes:  Negative for photophobia and visual disturbance.   Respiratory:  Negative for cough, shortness of breath and wheezing.    Cardiovascular:  Negative for chest pain and palpitations.   Gastrointestinal:  Negative for constipation and diarrhea.   Genitourinary:  Negative for dysuria and frequency.   Musculoskeletal:  Negative for arthralgias and myalgias.   Skin:  Negative for rash.   Neurological:  Positive for headaches. Negative for dizziness and light-headedness.   Psychiatric/Behavioral:  Negative for dysphoric mood and sleep disturbance. The patient is not nervous/anxious.      Pertinent Medical History         Medical History Reviewed by provider this encounter:  Tobacco  Allergies  Meds  Problems  Med Hx  Surg Hx  Fam Hx     .  Past Medical History   Past Medical History:   Diagnosis Date    Colitis     Colon polyp     Crohn disease (HCC)      Past Surgical History:   Procedure Laterality Date    BACK SURGERY      lumbar discectomy     COLON SURGERY  2002    11\" inrestines removed.    COLONOSCOPY      SKULL FRACTURE ELEVATION       Family History   Problem Relation Age of Onset    Coronary artery disease Mother     Diabetes Mother     Heart disease Mother     Coronary artery disease Father     Heart disease Father       reports that he has never smoked. He has never used smokeless tobacco. He reports current alcohol use. He reports current drug use. Frequency: 4.00 times per week. Drug: Marijuana.  Current Outpatient Medications on File Prior to Visit " "  Medication Sig Dispense Refill    meclizine (ANTIVERT) 25 mg tablet Take 1 tablet (25 mg total) by mouth 3 (three) times a day as needed for dizziness (Patient not taking: Reported on 11/26/2024) 30 tablet 0     No current facility-administered medications on file prior to visit.   No Known Allergies   Current Outpatient Medications on File Prior to Visit   Medication Sig Dispense Refill    meclizine (ANTIVERT) 25 mg tablet Take 1 tablet (25 mg total) by mouth 3 (three) times a day as needed for dizziness (Patient not taking: Reported on 11/26/2024) 30 tablet 0     No current facility-administered medications on file prior to visit.      Social History     Tobacco Use    Smoking status: Never    Smokeless tobacco: Never   Vaping Use    Vaping status: Never Used   Substance and Sexual Activity    Alcohol use: Yes     Comment: socially    Drug use: Yes     Frequency: 4.0 times per week     Types: Marijuana    Sexual activity: Yes     Partners: Female     Birth control/protection: Female Sterilization       Objective   /70 (BP Location: Left arm, Patient Position: Sitting, Cuff Size: Standard)   Pulse 70   Temp 98 °F (36.7 °C) (Tympanic)   Resp 18   Ht 5' 8.11\" (1.73 m)   Wt 74.8 kg (165 lb)   SpO2 97%   BMI 25.01 kg/m²     Physical Exam  Vitals and nursing note reviewed.   Constitutional:       Appearance: Normal appearance.   HENT:      Head: Normocephalic and atraumatic.      Right Ear: Tympanic membrane, ear canal and external ear normal.      Left Ear: Tympanic membrane, ear canal and external ear normal.      Nose: Nose normal.      Mouth/Throat:      Mouth: Mucous membranes are moist.   Eyes:      Conjunctiva/sclera: Conjunctivae normal.   Cardiovascular:      Rate and Rhythm: Normal rate and regular rhythm.      Heart sounds: Normal heart sounds.   Pulmonary:      Effort: Pulmonary effort is normal.      Breath sounds: Normal breath sounds.   Abdominal:      General: Bowel sounds are normal.      " Palpations: Abdomen is soft.   Musculoskeletal:         General: Normal range of motion.      Cervical back: Normal range of motion and neck supple.   Skin:     General: Skin is warm and dry.      Capillary Refill: Capillary refill takes less than 2 seconds.   Neurological:      General: No focal deficit present.      Mental Status: He is alert and oriented to person, place, and time.   Psychiatric:         Mood and Affect: Mood normal.         Behavior: Behavior normal.         Thought Content: Thought content normal.         Judgment: Judgment normal.       Administrative Statements   I have spent a total time of 30 minutes in caring for this patient on the day of the visit/encounter including Diagnostic results, Prognosis, Risks and benefits of tx options, Instructions for management, Patient and family education, Importance of tx compliance, Risk factor reductions, Impressions, Counseling / Coordination of care, Documenting in the medical record, Reviewing / ordering tests, medicine, procedures  , and Obtaining or reviewing history  .

## 2024-12-02 NOTE — ASSESSMENT & PLAN NOTE
Orders:    Comprehensive metabolic panel; Future    CBC and differential; Future    TSH, 3rd generation with Free T4 reflex; Future    Urinalysis with microscopic; Future

## 2024-12-02 NOTE — ASSESSMENT & PLAN NOTE
Orders:    MRI brain w wo contrast; Future    Comprehensive metabolic panel; Future    CBC and differential; Future    TSH, 3rd generation with Free T4 reflex; Future    Lipid Panel with Direct LDL reflex; Future    Urinalysis with microscopic; Future

## 2024-12-20 ENCOUNTER — HOSPITAL ENCOUNTER (OUTPATIENT)
Dept: ULTRASOUND IMAGING | Facility: HOSPITAL | Age: 54
Discharge: HOME | End: 2024-12-20
Payer: COMMERCIAL

## 2024-12-20 DIAGNOSIS — R59.0 AXILLARY LYMPHADENOPATHY: ICD-10-CM

## 2024-12-20 PROCEDURE — 76882 US LMTD JT/FCL EVL NVASC XTR: CPT

## 2025-01-15 ENCOUNTER — HOSPITAL ENCOUNTER (OUTPATIENT)
Dept: MRI IMAGING | Facility: HOSPITAL | Age: 55
Discharge: HOME | End: 2025-01-15
Payer: COMMERCIAL

## 2025-01-15 DIAGNOSIS — R51.9 NEW ONSET OF HEADACHES AFTER AGE 50: ICD-10-CM

## 2025-01-15 PROCEDURE — A9585 GADOBUTROL INJECTION: HCPCS | Performed by: NURSE PRACTITIONER

## 2025-01-15 PROCEDURE — 70553 MRI BRAIN STEM W/O & W/DYE: CPT

## 2025-01-15 RX ORDER — GADOBUTROL 604.72 MG/ML
7 INJECTION INTRAVENOUS
Status: COMPLETED | OUTPATIENT
Start: 2025-01-15 | End: 2025-01-15

## 2025-01-15 RX ADMIN — GADOBUTROL 7 ML: 604.72 INJECTION INTRAVENOUS at 21:07

## 2025-04-06 ENCOUNTER — RESULTS FOLLOW-UP (OUTPATIENT)
Dept: FAMILY MEDICINE CLINIC | Facility: CLINIC | Age: 55
End: 2025-04-06